# Patient Record
Sex: MALE | Race: WHITE | NOT HISPANIC OR LATINO | Employment: FULL TIME | URBAN - METROPOLITAN AREA
[De-identification: names, ages, dates, MRNs, and addresses within clinical notes are randomized per-mention and may not be internally consistent; named-entity substitution may affect disease eponyms.]

---

## 2018-06-19 ENCOUNTER — APPOINTMENT (OUTPATIENT)
Dept: RADIOLOGY | Facility: MEDICAL CENTER | Age: 43
End: 2018-06-19
Attending: EMERGENCY MEDICINE

## 2018-06-19 ENCOUNTER — HOSPITAL ENCOUNTER (EMERGENCY)
Facility: MEDICAL CENTER | Age: 43
End: 2018-06-19
Attending: EMERGENCY MEDICINE

## 2018-06-19 VITALS
HEIGHT: 67 IN | SYSTOLIC BLOOD PRESSURE: 230 MMHG | WEIGHT: 261.69 LBS | TEMPERATURE: 98.2 F | BODY MASS INDEX: 41.07 KG/M2 | DIASTOLIC BLOOD PRESSURE: 122 MMHG | HEART RATE: 89 BPM | RESPIRATION RATE: 17 BRPM | OXYGEN SATURATION: 95 %

## 2018-06-19 DIAGNOSIS — I10 ESSENTIAL HYPERTENSION: ICD-10-CM

## 2018-06-19 DIAGNOSIS — E11.8 TYPE 2 DIABETES MELLITUS WITH COMPLICATION, WITHOUT LONG-TERM CURRENT USE OF INSULIN (HCC): ICD-10-CM

## 2018-06-19 LAB
ALBUMIN SERPL BCP-MCNC: 3.7 G/DL (ref 3.2–4.9)
ALBUMIN/GLOB SERPL: 1.3 G/DL
ALP SERPL-CCNC: 58 U/L (ref 30–99)
ALT SERPL-CCNC: 12 U/L (ref 2–50)
ANION GAP SERPL CALC-SCNC: 8 MMOL/L (ref 0–11.9)
APTT PPP: 32 SEC (ref 24.7–36)
AST SERPL-CCNC: 12 U/L (ref 12–45)
BASOPHILS # BLD AUTO: 0.8 % (ref 0–1.8)
BASOPHILS # BLD: 0.11 K/UL (ref 0–0.12)
BILIRUB SERPL-MCNC: 0.4 MG/DL (ref 0.1–1.5)
BNP SERPL-MCNC: 58 PG/ML (ref 0–100)
BUN SERPL-MCNC: 14 MG/DL (ref 8–22)
CALCIUM SERPL-MCNC: 8.6 MG/DL (ref 8.5–10.5)
CHLORIDE SERPL-SCNC: 105 MMOL/L (ref 96–112)
CO2 SERPL-SCNC: 24 MMOL/L (ref 20–33)
CREAT SERPL-MCNC: 1.1 MG/DL (ref 0.5–1.4)
EOSINOPHIL # BLD AUTO: 0.4 K/UL (ref 0–0.51)
EOSINOPHIL NFR BLD: 3 % (ref 0–6.9)
ERYTHROCYTE [DISTWIDTH] IN BLOOD BY AUTOMATED COUNT: 43.4 FL (ref 35.9–50)
GLOBULIN SER CALC-MCNC: 2.9 G/DL (ref 1.9–3.5)
GLUCOSE SERPL-MCNC: 207 MG/DL (ref 65–99)
HCT VFR BLD AUTO: 45.6 % (ref 42–52)
HGB BLD-MCNC: 15.3 G/DL (ref 14–18)
IMM GRANULOCYTES # BLD AUTO: 0.06 K/UL (ref 0–0.11)
IMM GRANULOCYTES NFR BLD AUTO: 0.5 % (ref 0–0.9)
INR PPP: 0.94 (ref 0.87–1.13)
LIPASE SERPL-CCNC: 215 U/L (ref 11–82)
LYMPHOCYTES # BLD AUTO: 3.48 K/UL (ref 1–4.8)
LYMPHOCYTES NFR BLD: 26.1 % (ref 22–41)
MCH RBC QN AUTO: 29.1 PG (ref 27–33)
MCHC RBC AUTO-ENTMCNC: 33.6 G/DL (ref 33.7–35.3)
MCV RBC AUTO: 86.9 FL (ref 81.4–97.8)
MONOCYTES # BLD AUTO: 0.8 K/UL (ref 0–0.85)
MONOCYTES NFR BLD AUTO: 6 % (ref 0–13.4)
NEUTROPHILS # BLD AUTO: 8.48 K/UL (ref 1.82–7.42)
NEUTROPHILS NFR BLD: 63.6 % (ref 44–72)
NRBC # BLD AUTO: 0 K/UL
NRBC BLD-RTO: 0 /100 WBC
PLATELET # BLD AUTO: 284 K/UL (ref 164–446)
PMV BLD AUTO: 10.6 FL (ref 9–12.9)
POTASSIUM SERPL-SCNC: 3.4 MMOL/L (ref 3.6–5.5)
PROT SERPL-MCNC: 6.6 G/DL (ref 6–8.2)
PROTHROMBIN TIME: 12.3 SEC (ref 12–14.6)
RBC # BLD AUTO: 5.25 M/UL (ref 4.7–6.1)
SODIUM SERPL-SCNC: 137 MMOL/L (ref 135–145)
TROPONIN I SERPL-MCNC: 0.02 NG/ML (ref 0–0.04)
WBC # BLD AUTO: 13.3 K/UL (ref 4.8–10.8)

## 2018-06-19 PROCEDURE — 71045 X-RAY EXAM CHEST 1 VIEW: CPT

## 2018-06-19 PROCEDURE — 83690 ASSAY OF LIPASE: CPT

## 2018-06-19 PROCEDURE — 96374 THER/PROPH/DIAG INJ IV PUSH: CPT

## 2018-06-19 PROCEDURE — 85025 COMPLETE CBC W/AUTO DIFF WBC: CPT

## 2018-06-19 PROCEDURE — 700101 HCHG RX REV CODE 250: Performed by: EMERGENCY MEDICINE

## 2018-06-19 PROCEDURE — 83880 ASSAY OF NATRIURETIC PEPTIDE: CPT

## 2018-06-19 PROCEDURE — 93005 ELECTROCARDIOGRAM TRACING: CPT | Performed by: EMERGENCY MEDICINE

## 2018-06-19 PROCEDURE — 99285 EMERGENCY DEPT VISIT HI MDM: CPT

## 2018-06-19 PROCEDURE — 84484 ASSAY OF TROPONIN QUANT: CPT

## 2018-06-19 PROCEDURE — 80053 COMPREHEN METABOLIC PANEL: CPT

## 2018-06-19 PROCEDURE — 70450 CT HEAD/BRAIN W/O DYE: CPT

## 2018-06-19 PROCEDURE — 85610 PROTHROMBIN TIME: CPT

## 2018-06-19 PROCEDURE — 85730 THROMBOPLASTIN TIME PARTIAL: CPT

## 2018-06-19 RX ORDER — ACETAMINOPHEN 325 MG/1
TABLET ORAL
Status: COMPLETED
Start: 2018-06-19 | End: 2018-06-19

## 2018-06-19 RX ORDER — LABETALOL HYDROCHLORIDE 5 MG/ML
20 INJECTION, SOLUTION INTRAVENOUS ONCE
Status: COMPLETED | OUTPATIENT
Start: 2018-06-19 | End: 2018-06-19

## 2018-06-19 RX ADMIN — LABETALOL HYDROCHLORIDE 20 MG: 5 INJECTION, SOLUTION INTRAVENOUS at 16:04

## 2018-06-19 ASSESSMENT — PAIN SCALES - GENERAL: PAINLEVEL_OUTOF10: ASSUMED PAIN PRESENT

## 2018-06-19 NOTE — ED TRIAGE NOTES
Pt comes in complaining of left eye floaters. Pt was seen by his eye MD and sent here due to his blood pressure. Pt did have his eye dilated and pupil is larger on left then right. Pt was at one time placed on lisinopril and metformin however has been out of it for years due to financial reasons.

## 2018-06-19 NOTE — ED PROVIDER NOTES
"ED Provider Note  CHIEF COMPLAINT  Chief Complaint   Patient presents with   • Hypertension       HPI  Real Perry is a 43 y.o. male who presents from the optometrist office for evaluation of hypertension. The patient had some type of a retinal problem which will be dealt with later time by a retinal specialist. The optometrist sent the patient over here because the patient's blood pressure was over 200 systolic and over 120 diastolic. Patient is asymptomatic at this time. No headache, no chest pain, no difficulty breathing. No abdominal pain. No visual disturbances other than this retinal issue gets been evaluated by the optometrist and will be evaluated by the retinal specialist. The patient has a history of hypertension and diabetes because he does not have any insurance he has failed to take any medication in the past several months. He was previously on lisinopril. Also on metformin. No polyuria and no polydipsia. No chest pain or difficulty breathing or headache.    REVIEW OF SYSTEMS  No headache, no jaw pain, no chest pain, no difficulty breathing. No abdominal pain. No weakness.  ALL OTHER SYSTEMS NEGATIVE    ALLERGIES  No Known Allergies    CURRENT MEDICATIONS  None    PAST MEDICAL HISTORY  Past Medical History:   Diagnosis Date   • Diabetes    • Hypertension        SURGICAL HISTORY  Past Surgical History:   Procedure Laterality Date   • OPEN REDUCTION         FAMILY HISTORY  Family History   Problem Relation Age of Onset   • Stroke Father    • Cancer Father      prostate   • Diabetes Mother    • Diabetes Maternal Grandmother    • Cancer Paternal Grandfather      lung and prostate       SOCIAL HISTORY  Daily cigarette smoker. And drinks on occasion. She works but does not have health insurance which she states is the reason he is not on his medication.    PHYSICAL EXAM  GENERAL: Alert male adult  VITAL SIGNS: BP (!) 230/122   Pulse 86   Temp 36.8 °C (98.2 °F)   Resp (!) 23   Ht 1.702 m (5' 7\") "   Wt 118.7 kg (261 lb 11 oz)   SpO2 96%   BMI 40.99 kg/m²    Constitutional: Alertadult HENT: Scalp is normal size and nontender. Ears are clear. Nose is clear. Throat is clear with no stridor no drooling no trismus. Teeth are all intact.  Eyes: Pupils equal round and reactive to light, extraocular motor fall. There is no scleral icterus.  Neck: Neck is supple and nontender. There is no meningismus. No adenitis. No thyromegaly.  Lymphatic: No adenopathy.   Cardiovascular: Heart regular rhythm without murmurs or gallops   Thorax & Lungs: No chest wall tenderness. Lungs are clear. Patient has good breath sounds bilateral. No rales, no rhonchi, no wheezes.  Abdomen: Abdomen is soft, nontender, not rigid, no guarding, and no organomegaly. There is no palpable hernia   Skin: Warm, pink, and dry with no erythema and no rash.   Back: Nontender, no midline bony tenderness, no flank tenderness.  Extremities: Full range of motion  No tenderness to palpation and no deformities noted. No calf or thigh swelling. No calf or thigh tenderness. No clinical DVT.  Neurologic: Alert & oriented . Cranial nerves are grossly intact as tested. Patient moves all 4 extremities well. Patient has good strong flexion and extension of the ankle joints knee joints hip joints and elbow joints. Sensation is normal and symmetrical in the upper and lower extremities.   Psychiatric: Patient is alert oriented coherent and rational.     RADIOLOGY/PROCEDURES  CT-HEAD W/O   Final Result         No acute intracranial abnormality is seen.      Focal hypodensity in the right basal ganglia may represent a prominent Virchow-Sean space or an old lacunar infarct.      Mild paranasal sinus disease.      Fluid in the mastoid air cells on the left.      DX-CHEST-PORTABLE (1 VIEW)   Final Result      No radiographic evidence of acute cardiopulmonary process.            COURSE & MEDICAL DECISION MAKING  Pressurized from a optometrist office for evaluation of  asymptomatic hypertension. Blood pressure is 6E high with a systolic over 220 diastolic over 130.    Plan: #1 IV #2 cardiac monitor, pulse ox monitor, blood pressure monitor. #3. Labetalol 20 mg IV for the hypertension #4. Aboratory evaluation including CT of the head, CBC, CMP, troponin, BNP, etc. This is to rule out metabolic prompt, anemia, cardiac issues, etc. #5. Observation in the ED    Laboratory and reexamination: Chest x-ray is normal. CT of the head shows a focal hypodensity in the right basal ganglia which could represent an old lacunar infarct. No acute hemorrhage or stroke. He paces 215. Chemistry panel is otherwise unremarkable. No anemia and no bandemia. Blood pressure at 5:20 PM is now down to 160/98.    Results for orders placed or performed during the hospital encounter of 06/19/18   CBC WITH DIFFERENTIAL   Result Value Ref Range    WBC 13.3 (H) 4.8 - 10.8 K/uL    RBC 5.25 4.70 - 6.10 M/uL    Hemoglobin 15.3 14.0 - 18.0 g/dL    Hematocrit 45.6 42.0 - 52.0 %    MCV 86.9 81.4 - 97.8 fL    MCH 29.1 27.0 - 33.0 pg    MCHC 33.6 (L) 33.7 - 35.3 g/dL    RDW 43.4 35.9 - 50.0 fL    Platelet Count 284 164 - 446 K/uL    MPV 10.6 9.0 - 12.9 fL    Neutrophils-Polys 63.60 44.00 - 72.00 %    Lymphocytes 26.10 22.00 - 41.00 %    Monocytes 6.00 0.00 - 13.40 %    Eosinophils 3.00 0.00 - 6.90 %    Basophils 0.80 0.00 - 1.80 %    Immature Granulocytes 0.50 0.00 - 0.90 %    Nucleated RBC 0.00 /100 WBC    Neutrophils (Absolute) 8.48 (H) 1.82 - 7.42 K/uL    Lymphs (Absolute) 3.48 1.00 - 4.80 K/uL    Monos (Absolute) 0.80 0.00 - 0.85 K/uL    Eos (Absolute) 0.40 0.00 - 0.51 K/uL    Baso (Absolute) 0.11 0.00 - 0.12 K/uL    Immature Granulocytes (abs) 0.06 0.00 - 0.11 K/uL    NRBC (Absolute) 0.00 K/uL   COMP METABOLIC PANEL   Result Value Ref Range    Sodium 137 135 - 145 mmol/L    Potassium 3.4 (L) 3.6 - 5.5 mmol/L    Chloride 105 96 - 112 mmol/L    Co2 24 20 - 33 mmol/L    Anion Gap 8.0 0.0 - 11.9    Glucose 207 (H) 65 -  99 mg/dL    Bun 14 8 - 22 mg/dL    Creatinine 1.10 0.50 - 1.40 mg/dL    Calcium 8.6 8.5 - 10.5 mg/dL    AST(SGOT) 12 12 - 45 U/L    ALT(SGPT) 12 2 - 50 U/L    Alkaline Phosphatase 58 30 - 99 U/L    Total Bilirubin 0.4 0.1 - 1.5 mg/dL    Albumin 3.7 3.2 - 4.9 g/dL    Total Protein 6.6 6.0 - 8.2 g/dL    Globulin 2.9 1.9 - 3.5 g/dL    A-G Ratio 1.3 g/dL   LIPASE   Result Value Ref Range    Lipase 215 (H) 11 - 82 U/L   PROTHROMBIN TIME   Result Value Ref Range    PT 12.3 12.0 - 14.6 sec    INR 0.94 0.87 - 1.13   APTT   Result Value Ref Range    APTT 32.0 24.7 - 36.0 sec   TROPONIN   Result Value Ref Range    Troponin I 0.02 0.00 - 0.04 ng/mL   BTYPE NATRIURETIC PEPTIDE   Result Value Ref Range    B Natriuretic Peptide 58 0 - 100 pg/mL   ESTIMATED GFR   Result Value Ref Range    GFR If African American >60 >60 mL/min/1.73 m 2    GFR If Non African American >60 >60 mL/min/1.73 m 2      At 5:40 PM the patient is asymptomatic. With no headache and no chest pain and no difficulty breathing. He is up and ambulatory. He declines admission to the hospital. He has no abdominal pain. He says he doesn't drink. He'll be given a prescription for some Lopressor and I told him he needs to get his blood pressure checked tomorrow either here or at the [facility] were by a family physician.    Home treatment: #1 the patient been given a copy of all of his reports #2 prescription for Lopressor No. 3 patient will get his blood pressure checked here in the ED tomorrow or at the [facility] were by his family physician. #4 is being given instructions on hypertension.  FINAL IMPRESSION  1. Hypertensive urgency  2. Hypertension/diabetes mellitus2  3. Elevated lipase.       Electronically signed by: Gary Gansert, 6/19/2018 4:21 PM

## 2018-06-20 NOTE — DISCHARGE INSTRUCTIONS
Diabetes, Frequently Asked Questions  WHAT IS DIABETES?  Most of the food we eat is turned into glucose (sugar). Our bodies use it for energy. The pancreas makes a hormone called insulin. It helps glucose get into the cells of our bodies. When you have diabetes, your body either does not make enough insulin or cannot use its own insulin as well as it should. This causes sugars to build up in your blood.  WHAT ARE THE SYMPTOMS OF DIABETES?  · Frequent urination.   · Excessive thirst.   · Unexplained weight loss.   · Extreme hunger.   · Blurred vision.   · Tingling or numbness in hands or feet.   · Feeling very tired much of the time.   · Dry, itchy skin.   · Sores that are slow to heal.   · Yeast infections.   WHAT ARE THE TYPES OF DIABETES?  Type 1 Diabetes   · About 10% of affected people have this type.   · Usually occurs before the age of 30.   · Usually occurs in thin to normal weight people.   Type 2 Diabetes  · About 90% of affected people have this type.   · Usually occurs after the age of 40.   · Usually occurs in overweight people.   · More likely to have:   · A family history of diabetes.   · A history of diabetes during pregnancy (gestational diabetes).   · High blood pressure.   · High cholesterol and triglycerides.   Gestational Diabetes  · Occurs in about 4% of pregnancies.   · Usually goes away after the baby is born.   · More likely to occur in women with:   · Family history of diabetes.   · Previous gestational diabetes.   · Obese.   · Over 25 years old.   WHAT IS PRE-DIABETES?  Pre-diabetes means your blood glucose is higher than normal, but lower than the diabetes range. It also means you are at risk of getting type 2 diabetes and heart disease. If you are told you have pre-diabetes, have your blood glucose checked again in 1 to 2 years.  WHAT IS THE TREATMENT FOR DIABETES?  Treatment is aimed at keeping blood glucose near normal levels at all times. Learning how to manage this yourself is  important in treating diabetes. Depending on the type of diabetes you have, your treatment will include one or more of the following:  · Monitoring your blood glucose.   · Meal planning.   · Exercise.   · Oral medicine (pills) or insulin.   CAN DIABETES BE PREVENTED?  With type 1 diabetes, prevention is more difficult, because the triggers that cause it are not yet known.  With type 2 diabetes, prevention is more likely, with lifestyle changes:  · Maintain a healthy weight.   · Eat healthy.   · Exercise.   IS THERE A CURE FOR DIABETES?  No, there is no cure for diabetes. There is a lot of research going on that is looking for a cure, and progress is being made. Diabetes can be treated and controlled. People with diabetes can manage their diabetes and lead normal, active lives.  SHOULD I BE TESTED FOR DIABETES?  If you are at least 45 years old, you should be tested for diabetes. You should be tested again every 3 years. If you are 45 or older and overweight, you may want to get tested more often. If you are younger than 45, overweight, and have one or more of the following risk factors, you should be tested:  · Family history of diabetes.   · Inactive lifestyle.   · High blood pressure.   WHAT ARE SOME OTHER SOURCES FOR INFORMATION ON DIABETES?  The following organizations may help in your search for more information on diabetes:  National Diabetes Education Program (NDEP)  Internet: http://www.ndep.nih.gov/resources  American Diabetes Association  Internet: http://www.diabetes.org   Juvenile Diabetes Foundation International  Internet: http://www.jdf.org  Document Released: 12/20/2004 Document Revised: 03/11/2013 Document Reviewed: 10/15/2010  [employer]Care® Patient Information ©2013 [employer].  Arterial Hypertension  Arterial hypertension (high blood pressure) is a condition of elevated pressure in your blood vessels. Hypertension over a long period of time is a risk factor for strokes, heart attacks, and heart  "failure. It is also the leading cause of kidney (renal) failure.   CAUSES   · In Adults -- Over 90% of all hypertension has no known cause. This is called essential or primary hypertension. In the other 10% of people with hypertension, the increase in blood pressure is caused by another disorder. This is called secondary hypertension. Important causes of secondary hypertension are:  · Heavy alcohol use.  · Obstructive sleep apnea.  · Hyperaldosterosim (Conn's syndrome).  · Steroid use.  · Chronic kidney failure.  · Hyperparathyroidism.  · Medications.  · Renal artery stenosis.  · Pheochromocytoma.  · Cushing's disease.  · Coarctation of the aorta.  · Scleroderma renal crisis.  · Licorice (in excessive amounts).  · Drugs (cocaine, methamphetamine).  Your caregiver can explain any items above that apply to you.  · In Children -- Secondary hypertension is more common and should always be considered.  · Pregnancy -- Few women of childbearing age have high blood pressure. However, up to 10% of them develop hypertension of pregnancy. Generally, this will not harm the woman. It may be a sign of 3 complications of pregnancy: preeclampsia, HELLP syndrome, and eclampsia. Follow up and control with medication is necessary.  SYMPTOMS   · This condition normally does not produce any noticeable symptoms. It is usually found during a routine exam.  · Malignant hypertension is a late problem of high blood pressure. It may have the following symptoms:  · Headaches.  · Blurred vision.  · End-organ damage (this means your kidneys, heart, lungs, and other organs are being damaged).  · Stressful situations can increase the blood pressure. If a person with normal blood pressure has their blood pressure go up while being seen by their caregiver, this is often termed \"white coat hypertension.\" Its importance is not known. It may be related with eventually developing hypertension or complications of hypertension.  · Hypertension is often " "confused with mental tension, stress, and anxiety.  DIAGNOSIS   The diagnosis is made by 3 separate blood pressure measurements. They are taken at least 1 week apart from each other. If there is organ damage from hypertension, the diagnosis may be made without repeat measurements.  Hypertension is usually identified by having blood pressure readings:  · Above 140/90 mmHg measured in both arms, at 3 separate times, over a couple weeks.  · Over 130/80 mmHg should be considered a risk factor and may require treatment in patients with diabetes.  Blood pressure readings over 120/80 mmHg are called \"pre-hypertension\" even in non-diabetic patients.  To get a true blood pressure measurement, use the following guidelines. Be aware of the factors that can alter blood pressure readings.  · Take measurements at least 1 hour after caffeine.  · Take measurements 30 minutes after smoking and without any stress. This is another reason to quit smoking  it raises your blood pressure.  · Use a proper cuff size. Ask your caregiver if you are not sure about your cuff size.  · Most home blood pressure cuffs are automatic. They will measure systolic and diastolic pressures. The systolic pressure is the pressure reading at the start of sounds. Diastolic pressure is the pressure at which the sounds disappear. If you are elderly, measure pressures in multiple postures. Try sitting, lying or standing.  · Sit at rest for a minimum of 5 minutes before taking measurements.  · You should not be on any medications like decongestants. These are found in many cold medications.  · Record your blood pressure readings and review them with your caregiver.  If you have hypertension:  · Your caregiver may do tests to be sure you do not have secondary hypertension (see \"causes\" above).  · Your caregiver may also look for signs of metabolic syndrome. This is also called Syndrome X or Insulin Resistance Syndrome. You may have this syndrome if you have type 2 " diabetes, abdominal obesity, and abnormal blood lipids in addition to hypertension.  · Your caregiver will take your medical and family history and perform a physical exam.  · Diagnostic tests may include blood tests (for glucose, cholesterol, potassium, and kidney function), a urinalysis, or an EKG. Other tests may also be necessary depending on your condition.  PREVENTION   There are important lifestyle issues that you can adopt to reduce your chance of developing hypertension:  · Maintain a normal weight.  · Limit the amount of salt (sodium) in your diet.  · Exercise often.  · Limit alcohol intake.  · Get enough potassium in your diet. Discuss specific advice with your caregiver.  · Follow a DASH diet (dietary approaches to stop hypertension). This diet is rich in fruits, vegetables, and low-fat dairy products, and avoids certain fats.  PROGNOSIS   Essential hypertension cannot be cured. Lifestyle changes and medical treatment can lower blood pressure and reduce complications. The prognosis of secondary hypertension depends on the underlying cause. Many people whose hypertension is controlled with medicine or lifestyle changes can live a normal, healthy life.   RISKS AND COMPLICATIONS   While high blood pressure alone is not an illness, it often requires treatment due to its short- and long-term effects on many organs. Hypertension increases your risk for:  · CVAs or strokes (cerebrovascular accident).  · Heart failure due to chronically high blood pressure (hypertensive cardiomyopathy).  · Heart attack (myocardial infarction).  · Damage to the retina (hypertensive retinopathy).  · Kidney failure (hypertensive nephropathy).  Your caregiver can explain list items above that apply to you. Treatment of hypertension can significantly reduce the risk of complications.  TREATMENT   · For overweight patients, weight loss and regular exercise are recommended. Physical fitness lowers blood pressure.  · Mild hypertension  "is usually treated with diet and exercise. A diet rich in fruits and vegetables, fat-free dairy products, and foods low in fat and salt (sodium) can help lower blood pressure. Decreasing salt intake decreases blood pressure in a 1/3 of people.  · Stop smoking if you are a smoker.  The steps above are highly effective in reducing blood pressure. While these actions are easy to suggest, they are difficult to achieve. Most patients with moderate or severe hypertension end up requiring medications to bring their blood pressure down to a normal level. There are several classes of medications for treatment. Blood pressure pills (antihypertensives) will lower blood pressure by their different actions. Lowering the blood pressure by 10 mmHg may decrease the risk of complications by as much as 25%.  The goal of treatment is effective blood pressure control. This will reduce your risk for complications. Your caregiver will help you determine the best treatment for you according to your lifestyle. What is excellent treatment for one person, may not be for you.  HOME CARE INSTRUCTIONS   · Do not smoke.  · Follow the lifestyle changes outlined in the \"Prevention\" section.  · If you are on medications, follow the directions carefully. Blood pressure medications must be taken as prescribed. Skipping doses reduces their benefit. It also puts you at risk for problems.  · Follow up with your caregiver, as directed.  · If you are asked to monitor your blood pressure at home, follow the guidelines in the \"Diagnosis\" section above.  SEEK MEDICAL CARE IF:   · You think you are having medication side effects.  · You have recurrent headaches or lightheadedness.  · You have swelling in your ankles.  · You have trouble with your vision.  SEEK IMMEDIATE MEDICAL CARE IF:   · You have sudden onset of chest pain or pressure, difficulty breathing, or other symptoms of a heart attack.  · You have a severe headache.  · You have symptoms of a stroke " (such as sudden weakness, difficulty speaking, difficulty walking).  MAKE SURE YOU:   · Understand these instructions.  · Will watch your condition.  · Will get help right away if you are not doing well or get worse.  Document Released: 12/18/2006 Document Revised: 03/11/2013 Document Reviewed: 07/17/2008  [employer]® Patient Information ©2014 [employer].

## 2018-06-21 LAB — EKG IMPRESSION: NORMAL

## 2019-06-24 ENCOUNTER — HOSPITAL ENCOUNTER (INPATIENT)
Facility: MEDICAL CENTER | Age: 44
LOS: 4 days | DRG: 305 | End: 2019-06-28
Attending: EMERGENCY MEDICINE | Admitting: HOSPITALIST

## 2019-06-24 DIAGNOSIS — R73.9 HYPERGLYCEMIA: ICD-10-CM

## 2019-06-24 DIAGNOSIS — N28.9 RENAL INSUFFICIENCY: ICD-10-CM

## 2019-06-24 DIAGNOSIS — H54.62 VISION LOSS OF LEFT EYE: ICD-10-CM

## 2019-06-24 DIAGNOSIS — I16.0 HYPERTENSIVE URGENCY: ICD-10-CM

## 2019-06-24 PROCEDURE — 99358 PROLONG SERVICE W/O CONTACT: CPT | Performed by: HOSPITALIST

## 2019-06-24 PROCEDURE — 99285 EMERGENCY DEPT VISIT HI MDM: CPT

## 2019-06-24 PROCEDURE — 770006 HCHG ROOM/CARE - MED/SURG/GYN SEMI*

## 2019-06-24 PROCEDURE — 83036 HEMOGLOBIN GLYCOSYLATED A1C: CPT

## 2019-06-24 PROCEDURE — 99406 BEHAV CHNG SMOKING 3-10 MIN: CPT | Performed by: HOSPITALIST

## 2019-06-24 PROCEDURE — 36415 COLL VENOUS BLD VENIPUNCTURE: CPT

## 2019-06-24 PROCEDURE — 99223 1ST HOSP IP/OBS HIGH 75: CPT | Mod: 25 | Performed by: HOSPITALIST

## 2019-06-24 RX ORDER — M-VIT,TX,IRON,MINS/CALC/FOLIC 27MG-0.4MG
1 TABLET ORAL DAILY
COMMUNITY
End: 2022-07-14

## 2019-06-24 ASSESSMENT — LIFESTYLE VARIABLES: DO YOU DRINK ALCOHOL: NO

## 2019-06-25 ENCOUNTER — HOSPITAL ENCOUNTER (OUTPATIENT)
Dept: RADIOLOGY | Facility: MEDICAL CENTER | Age: 44
End: 2019-06-25

## 2019-06-25 PROBLEM — H53.432: Status: ACTIVE | Noted: 2019-06-25

## 2019-06-25 PROBLEM — H53.132 ACUTE LOSS OF VISION, LEFT: Status: ACTIVE | Noted: 2019-06-25

## 2019-06-25 PROBLEM — I10 ACCELERATED HYPERTENSION: Status: ACTIVE | Noted: 2019-06-25

## 2019-06-25 PROBLEM — N17.9 AKI (ACUTE KIDNEY INJURY) (HCC): Status: ACTIVE | Noted: 2019-06-25

## 2019-06-25 LAB
ALBUMIN SERPL BCP-MCNC: 3.3 G/DL (ref 3.2–4.9)
ALBUMIN/GLOB SERPL: 1.1 G/DL
ALP SERPL-CCNC: 59 U/L (ref 30–99)
ALT SERPL-CCNC: 12 U/L (ref 2–50)
ANION GAP SERPL CALC-SCNC: 11 MMOL/L (ref 0–11.9)
AST SERPL-CCNC: 13 U/L (ref 12–45)
BASOPHILS # BLD AUTO: 1 % (ref 0–1.8)
BASOPHILS # BLD: 0.1 K/UL (ref 0–0.12)
BILIRUB SERPL-MCNC: 0.5 MG/DL (ref 0.1–1.5)
BUN SERPL-MCNC: 20 MG/DL (ref 8–22)
CALCIUM SERPL-MCNC: 8.5 MG/DL (ref 8.5–10.5)
CHLORIDE SERPL-SCNC: 104 MMOL/L (ref 96–112)
CHLORIDE UR-SCNC: 62 MMOL/L
CO2 SERPL-SCNC: 23 MMOL/L (ref 20–33)
CREAT SERPL-MCNC: 1.8 MG/DL (ref 0.5–1.4)
CREAT UR-MCNC: 188.7 MG/DL
EOSINOPHIL # BLD AUTO: 0.31 K/UL (ref 0–0.51)
EOSINOPHIL NFR BLD: 3 % (ref 0–6.9)
ERYTHROCYTE [DISTWIDTH] IN BLOOD BY AUTOMATED COUNT: 43.8 FL (ref 35.9–50)
EST. AVERAGE GLUCOSE BLD GHB EST-MCNC: 240 MG/DL
GLOBULIN SER CALC-MCNC: 2.9 G/DL (ref 1.9–3.5)
GLUCOSE BLD-MCNC: 102 MG/DL (ref 65–99)
GLUCOSE BLD-MCNC: 171 MG/DL (ref 65–99)
GLUCOSE BLD-MCNC: 240 MG/DL (ref 65–99)
GLUCOSE BLD-MCNC: 249 MG/DL (ref 65–99)
GLUCOSE SERPL-MCNC: 253 MG/DL (ref 65–99)
HBA1C MFR BLD: 10 % (ref 0–5.6)
HCT VFR BLD AUTO: 42.3 % (ref 42–52)
HGB BLD-MCNC: 14.5 G/DL (ref 14–18)
IMM GRANULOCYTES # BLD AUTO: 0.03 K/UL (ref 0–0.11)
IMM GRANULOCYTES NFR BLD AUTO: 0.3 % (ref 0–0.9)
LYMPHOCYTES # BLD AUTO: 2.14 K/UL (ref 1–4.8)
LYMPHOCYTES NFR BLD: 20.5 % (ref 22–41)
MCH RBC QN AUTO: 29.8 PG (ref 27–33)
MCHC RBC AUTO-ENTMCNC: 34.3 G/DL (ref 33.7–35.3)
MCV RBC AUTO: 86.9 FL (ref 81.4–97.8)
MONOCYTES # BLD AUTO: 0.73 K/UL (ref 0–0.85)
MONOCYTES NFR BLD AUTO: 7 % (ref 0–13.4)
NEUTROPHILS # BLD AUTO: 7.13 K/UL (ref 1.82–7.42)
NEUTROPHILS NFR BLD: 68.2 % (ref 44–72)
NRBC # BLD AUTO: 0 K/UL
NRBC BLD-RTO: 0 /100 WBC
PLATELET # BLD AUTO: 255 K/UL (ref 164–446)
PMV BLD AUTO: 10.8 FL (ref 9–12.9)
POTASSIUM SERPL-SCNC: 3.8 MMOL/L (ref 3.6–5.5)
POTASSIUM UR-SCNC: 33.9 MMOL/L
PROT SERPL-MCNC: 6.2 G/DL (ref 6–8.2)
PROT UR-MCNC: 1157.3 MG/DL (ref 0–15)
RBC # BLD AUTO: 4.87 M/UL (ref 4.7–6.1)
SODIUM SERPL-SCNC: 138 MMOL/L (ref 135–145)
SODIUM UR-SCNC: 70 MMOL/L
WBC # BLD AUTO: 10.4 K/UL (ref 4.8–10.8)

## 2019-06-25 PROCEDURE — A9270 NON-COVERED ITEM OR SERVICE: HCPCS | Performed by: FAMILY MEDICINE

## 2019-06-25 PROCEDURE — 700111 HCHG RX REV CODE 636 W/ 250 OVERRIDE (IP): Performed by: HOSPITALIST

## 2019-06-25 PROCEDURE — 82436 ASSAY OF URINE CHLORIDE: CPT

## 2019-06-25 PROCEDURE — 80053 COMPREHEN METABOLIC PANEL: CPT

## 2019-06-25 PROCEDURE — 700102 HCHG RX REV CODE 250 W/ 637 OVERRIDE(OP): Performed by: FAMILY MEDICINE

## 2019-06-25 PROCEDURE — 99233 SBSQ HOSP IP/OBS HIGH 50: CPT | Performed by: FAMILY MEDICINE

## 2019-06-25 PROCEDURE — 700102 HCHG RX REV CODE 250 W/ 637 OVERRIDE(OP): Performed by: HOSPITALIST

## 2019-06-25 PROCEDURE — 82962 GLUCOSE BLOOD TEST: CPT

## 2019-06-25 PROCEDURE — 84133 ASSAY OF URINE POTASSIUM: CPT

## 2019-06-25 PROCEDURE — 85025 COMPLETE CBC W/AUTO DIFF WBC: CPT

## 2019-06-25 PROCEDURE — 84300 ASSAY OF URINE SODIUM: CPT

## 2019-06-25 PROCEDURE — 84156 ASSAY OF PROTEIN URINE: CPT

## 2019-06-25 PROCEDURE — 770006 HCHG ROOM/CARE - MED/SURG/GYN SEMI*

## 2019-06-25 PROCEDURE — 82570 ASSAY OF URINE CREATININE: CPT

## 2019-06-25 PROCEDURE — 36415 COLL VENOUS BLD VENIPUNCTURE: CPT

## 2019-06-25 PROCEDURE — A9270 NON-COVERED ITEM OR SERVICE: HCPCS | Performed by: HOSPITALIST

## 2019-06-25 RX ORDER — HYDROCHLOROTHIAZIDE 25 MG/1
25 TABLET ORAL
Status: DISCONTINUED | OUTPATIENT
Start: 2019-06-25 | End: 2019-06-27

## 2019-06-25 RX ORDER — LISINOPRIL 20 MG/1
40 TABLET ORAL
Status: DISCONTINUED | OUTPATIENT
Start: 2019-06-25 | End: 2019-06-27

## 2019-06-25 RX ORDER — BISACODYL 10 MG
10 SUPPOSITORY, RECTAL RECTAL
Status: DISCONTINUED | OUTPATIENT
Start: 2019-06-25 | End: 2019-06-28 | Stop reason: HOSPADM

## 2019-06-25 RX ORDER — PROMETHAZINE HYDROCHLORIDE 25 MG/1
12.5-25 TABLET ORAL EVERY 4 HOURS PRN
Status: DISCONTINUED | OUTPATIENT
Start: 2019-06-25 | End: 2019-06-28 | Stop reason: HOSPADM

## 2019-06-25 RX ORDER — PROMETHAZINE HYDROCHLORIDE 25 MG/1
12.5-25 SUPPOSITORY RECTAL EVERY 4 HOURS PRN
Status: DISCONTINUED | OUTPATIENT
Start: 2019-06-25 | End: 2019-06-28 | Stop reason: HOSPADM

## 2019-06-25 RX ORDER — ONDANSETRON 4 MG/1
4 TABLET, ORALLY DISINTEGRATING ORAL EVERY 4 HOURS PRN
Status: DISCONTINUED | OUTPATIENT
Start: 2019-06-25 | End: 2019-06-28 | Stop reason: HOSPADM

## 2019-06-25 RX ORDER — POLYETHYLENE GLYCOL 3350 17 G/17G
1 POWDER, FOR SOLUTION ORAL
Status: DISCONTINUED | OUTPATIENT
Start: 2019-06-25 | End: 2019-06-28 | Stop reason: HOSPADM

## 2019-06-25 RX ORDER — ONDANSETRON 2 MG/ML
4 INJECTION INTRAMUSCULAR; INTRAVENOUS EVERY 4 HOURS PRN
Status: DISCONTINUED | OUTPATIENT
Start: 2019-06-25 | End: 2019-06-28 | Stop reason: HOSPADM

## 2019-06-25 RX ORDER — AMOXICILLIN 250 MG
2 CAPSULE ORAL 2 TIMES DAILY
Status: DISCONTINUED | OUTPATIENT
Start: 2019-06-25 | End: 2019-06-28 | Stop reason: HOSPADM

## 2019-06-25 RX ORDER — GLIPIZIDE 5 MG/1
5 TABLET ORAL
Status: DISCONTINUED | OUTPATIENT
Start: 2019-06-25 | End: 2019-06-28 | Stop reason: HOSPADM

## 2019-06-25 RX ORDER — ATORVASTATIN CALCIUM 40 MG/1
40 TABLET, FILM COATED ORAL EVERY EVENING
Status: DISCONTINUED | OUTPATIENT
Start: 2019-06-25 | End: 2019-06-28 | Stop reason: HOSPADM

## 2019-06-25 RX ORDER — NICOTINE 21 MG/24HR
14 PATCH, TRANSDERMAL 24 HOURS TRANSDERMAL
Status: DISCONTINUED | OUTPATIENT
Start: 2019-06-25 | End: 2019-06-28 | Stop reason: HOSPADM

## 2019-06-25 RX ORDER — HYDROCHLOROTHIAZIDE 12.5 MG/1
12.5 TABLET ORAL
Status: DISCONTINUED | OUTPATIENT
Start: 2019-06-25 | End: 2019-06-25

## 2019-06-25 RX ORDER — LISINOPRIL 20 MG/1
20 TABLET ORAL
Status: DISCONTINUED | OUTPATIENT
Start: 2019-06-25 | End: 2019-06-25

## 2019-06-25 RX ORDER — ENALAPRILAT 1.25 MG/ML
1.25 INJECTION INTRAVENOUS EVERY 6 HOURS PRN
Status: DISCONTINUED | OUTPATIENT
Start: 2019-06-25 | End: 2019-06-28 | Stop reason: HOSPADM

## 2019-06-25 RX ORDER — HYDRALAZINE HYDROCHLORIDE 20 MG/ML
10 INJECTION INTRAMUSCULAR; INTRAVENOUS EVERY 6 HOURS PRN
Status: DISCONTINUED | OUTPATIENT
Start: 2019-06-25 | End: 2019-06-28 | Stop reason: HOSPADM

## 2019-06-25 RX ADMIN — HYDROCHLOROTHIAZIDE 25 MG: 25 TABLET ORAL at 15:17

## 2019-06-25 RX ADMIN — INSULIN HUMAN 2 UNITS: 100 INJECTION, SOLUTION PARENTERAL at 08:52

## 2019-06-25 RX ADMIN — GLIPIZIDE 5 MG: 10 TABLET ORAL at 16:59

## 2019-06-25 RX ADMIN — HYDRALAZINE HYDROCHLORIDE 10 MG: 20 INJECTION INTRAMUSCULAR; INTRAVENOUS at 22:37

## 2019-06-25 RX ADMIN — INSULIN HUMAN 1 UNITS: 100 INJECTION, SOLUTION PARENTERAL at 17:06

## 2019-06-25 RX ADMIN — LISINOPRIL 40 MG: 20 TABLET ORAL at 15:18

## 2019-06-25 RX ADMIN — HYDRALAZINE HYDROCHLORIDE 10 MG: 20 INJECTION INTRAMUSCULAR; INTRAVENOUS at 20:50

## 2019-06-25 RX ADMIN — ATORVASTATIN CALCIUM 40 MG: 40 TABLET, FILM COATED ORAL at 16:58

## 2019-06-25 RX ADMIN — INSULIN HUMAN 2 UNITS: 100 INJECTION, SOLUTION PARENTERAL at 11:06

## 2019-06-25 RX ADMIN — LISINOPRIL 20 MG: 20 TABLET ORAL at 00:43

## 2019-06-25 RX ADMIN — HYDRALAZINE HYDROCHLORIDE 10 MG: 20 INJECTION INTRAMUSCULAR; INTRAVENOUS at 03:59

## 2019-06-25 RX ADMIN — HYDRALAZINE HYDROCHLORIDE 10 MG: 20 INJECTION INTRAMUSCULAR; INTRAVENOUS at 02:23

## 2019-06-25 RX ADMIN — HYDROCHLOROTHIAZIDE 12.5 MG: 12.5 TABLET ORAL at 05:05

## 2019-06-25 RX ADMIN — HYDRALAZINE HYDROCHLORIDE 10 MG: 20 INJECTION INTRAMUSCULAR; INTRAVENOUS at 11:57

## 2019-06-25 RX ADMIN — SENNOSIDES, DOCUSATE SODIUM 2 TABLET: 50; 8.6 TABLET, FILM COATED ORAL at 05:05

## 2019-06-25 ASSESSMENT — COGNITIVE AND FUNCTIONAL STATUS - GENERAL
SUGGESTED CMS G CODE MODIFIER DAILY ACTIVITY: CH
SUGGESTED CMS G CODE MODIFIER MOBILITY: CH
MOBILITY SCORE: 24
DAILY ACTIVITIY SCORE: 24

## 2019-06-25 ASSESSMENT — ENCOUNTER SYMPTOMS
ABDOMINAL PAIN: 0
DIARRHEA: 0
ORTHOPNEA: 0
COUGH: 0
BRUISES/BLEEDS EASILY: 0
TINGLING: 1
FOCAL WEAKNESS: 0
WHEEZING: 0
NECK PAIN: 0
FEVER: 0
HEADACHES: 0
PHOTOPHOBIA: 0
MYALGIAS: 0
EYE PAIN: 0
PALPITATIONS: 0
NAUSEA: 0
BLURRED VISION: 1
DEPRESSION: 0
CHILLS: 0
SENSORY CHANGE: 1
HEMOPTYSIS: 0
DOUBLE VISION: 0
BACK PAIN: 0
DIZZINESS: 0
TINGLING: 0
SHORTNESS OF BREATH: 0
HEARTBURN: 0
VOMITING: 0
WEIGHT LOSS: 0
SORE THROAT: 0

## 2019-06-25 ASSESSMENT — LIFESTYLE VARIABLES
SUBSTANCE_ABUSE: 0
EVER_SMOKED: YES

## 2019-06-25 ASSESSMENT — PATIENT HEALTH QUESTIONNAIRE - PHQ9
SUM OF ALL RESPONSES TO PHQ9 QUESTIONS 1 AND 2: 0
1. LITTLE INTEREST OR PLEASURE IN DOING THINGS: NOT AT ALL
2. FEELING DOWN, DEPRESSED, IRRITABLE, OR HOPELESS: NOT AT ALL

## 2019-06-25 NOTE — PROGRESS NOTES
2 RN Skin Check:    R large inguinal hernia present    All other bony prominences intact, no evidence of skin breakdown

## 2019-06-25 NOTE — ASSESSMENT & PLAN NOTE
The patient's blood pressure is elevated in the 300 range, suspect he has been living at this level for quite some time.  He has no evidence of DKA.    6/25: Hemoglobin A1c 10.0 %.  Patient has been off his diabetes medications for 5 years due to financial difficulties.  He does not have insurance.  Will be poor candidate for insulin at this point.  I started glipizide 5 mg p.o. twice daily.  Monitor blood glucoses for now.  Continue with sliding scale insulin.  6/26: Blood glucoses much improved with glipizide.  Continue to monitor for now.  6/27: Blood glucoses well controlled for now.  Continue current regimen.

## 2019-06-25 NOTE — H&P
Hospital Medicine History & Physical Note    Date of Service  6/24/2019    Primary Care Physician  Pcp Pt States None    Consultants  Dr. Garza, ophthalmology    Code Status  Full    Chief Complaint  Chief Complaint   Patient presents with   • Hypertension       History of Presenting Illness  44 y.o. male who presented on 6/24/2019 in transfer from outside facility for hypertensive urgency/emergency and visual field changes in the left eye.  This is a 44-year-old gentleman with a known history of hypertension, diabetes mellitus, and hyperlipidemia who has been untreated for the last 5 years.  The patient reported the outside facility with complaints of elevated blood pressures and visual changes in the left eye.  He states that he was diagnosed with hypertension approximately 10 years ago but that he discontinued his medications 5 years ago secondary to financial difficulties.  He was previously on an ACE inhibitor.  In the past year, he has had what he describes as a ruptured vessel in the back of his eyes and has required some type of injection with ophthalmology.  He decided to bring himself to the hospital today to request assistance with managing his medical issues.    At the outside facility, chest x-ray was obtained which showed no acute pulmonary or cardiac abnormalities.  WBC 10.9 hemoglobin 14.3 hematocrit 42.5 platelet 273 sodium 140 potassium 3.9 chloride 107 CO2 26 BUN 23 creatinine 2.12 and glucose 318.  The patient was transferred to our facility for higher level of care and specialist consultation.    Review of Systems  Review of Systems   Constitutional: Negative for chills and fever.   HENT: Negative for congestion and sore throat.    Eyes: Negative for photophobia.   Respiratory: Negative for cough, shortness of breath and wheezing.    Cardiovascular: Negative for chest pain and palpitations.   Gastrointestinal: Negative for abdominal pain, diarrhea, nausea and vomiting.   Genitourinary: Negative  for dysuria.   Musculoskeletal: Negative for myalgias.   Skin: Negative.    Neurological: Positive for sensory change (No vision in the center of the left eye). Negative for dizziness, tingling, focal weakness and headaches.   Psychiatric/Behavioral: Negative for depression and suicidal ideas.       Past Medical History  Past Medical History:   Diagnosis Date   • Diabetes    • Hypertension        Surgical History  Past Surgical History:   Procedure Laterality Date   • OPEN REDUCTION         Family History  Family History   Problem Relation Age of Onset   • Stroke Father    • Cancer Father         prostate   • Diabetes Mother    • Diabetes Maternal Grandmother    • Cancer Paternal Grandfather         lung and prostate       Social History  Social History   Substance Use Topics   • Smoking status: Current Every Day Smoker     Packs/day: 1.00     Years: 22.00     Types: Cigarettes   • Smokeless tobacco: Never Used   • Alcohol use Yes      Comment: 1-2 year       Allergies  No Known Allergies    Medications  No current facility-administered medications on file prior to encounter.      Current Outpatient Prescriptions on File Prior to Encounter   Medication Sig Dispense Refill   • metoprolol (LOPRESSOR) 25 MG Tab Take 1 Tab by mouth 2 times a day. 60 Tab 0   • metformin (GLUCOPHAGE) 500 MG Tab Take 1 Tab by mouth 2 times a day, with meals. 60 Tab 0   • lisinopril-hydrochlorothiazide (PRINZIDE, ZESTORETIC) 20-12.5 MG per tablet Take 1 Tab by mouth every day. 30 Tab 0   • metformin (GLUCOPHAGE) 500 MG TABS Take 1 Tab by mouth 2 times a day, with meals. 60 Tab 3   • fenofibrate (TRICOR) 48 MG TABS Take 1 Tab by mouth every day. 30 Tab 3   • fenofibrate micronized (ANTARA) 43 MG CAPS Take 1 Cap by mouth every morning. 30 Each 3       Physical Exam  Hemodynamics  Temp (24hrs), Av.7 °C (98 °F), Min:36.7 °C (98 °F), Max:36.7 °C (98 °F)   Temperature: 36.7 °C (98 °F)  Pulse  Av  Min: 85  Max: 85    Blood Pressure: (!)  "186/113      Respiratory      Respiration: 18             Physical Exam   Constitutional: He is oriented to person, place, and time. No distress.   Obese   HENT:   Head: Normocephalic and atraumatic.   Right Ear: External ear normal.   Left Ear: External ear normal.   Eyes: EOM are normal. Right eye exhibits no discharge. Left eye exhibits no discharge.   Neck: Neck supple. No JVD present.   Cardiovascular: Normal rate, regular rhythm and normal heart sounds.    Pulmonary/Chest: Effort normal and breath sounds normal. No respiratory distress. He exhibits no tenderness.   Abdominal: Soft. Bowel sounds are normal. He exhibits no distension. There is no tenderness.   Musculoskeletal: He exhibits no edema.   Neurological: He is alert and oriented to person, place, and time. No cranial nerve deficit.   Skin: Skin is dry. He is not diaphoretic. No erythema.   Psychiatric: He has a normal mood and affect. His behavior is normal.   Nursing note and vitals reviewed.    Capillary refill less than 3 seconds, distal pulses intact    Laboratory:          No results for input(s): ALTSGPT, ASTSGOT, ALKPHOSPHAT, TBILIRUBIN, DBILIRUBIN, GAMMAGT, AMYLASE, LIPASE, ALB, PREALBUMIN, GLUCOSE in the last 72 hours.              Lab Results   Component Value Date    TROPONINI 0.02 06/19/2018       Imaging  No results found.      Assessment/Plan:  Anticipate that patient will need greater than 2 midnights for management of the discussed medical issues.    * Acute loss of vision, left   Assessment & Plan    Patient reports previous history of \"retinal bleed\".  He states that he was seen by ophthalmology within the left and had some type of injection.  CT scan of the head is negative, and Dr. Garza of ophthalmology has been consulted.  We will begin to control his blood pressure which is accelerated and medically optimize his other medical comorbidities.  The patient will be admitted to the hospital for close monitoring and I look forward to " Dr. Garza's recommendations.     JOY (acute kidney injury) ([facility])   Assessment & Plan    Acute kidney injury versus potential chronic kidney disease.  The patient has uncontrolled hypertension, uncontrolled hyperlipidemia, and uncontrolled diabetes.  It would not be surprising that he has developed chronic disease.  I will check urine electrolytes and repeat renal function in the morning.     Accelerated hypertension   Assessment & Plan    Secondary to medical noncompliance.  CT scan of the head is negative.  His blood pressure has improved since his arrival to our facility but still poorly controlled.  He did receive clonidine at the outside hospital however I believe this is a poor choice given its rebound potential in a patient with a history of medical noncompliance.  He was previously on lisinopril therefore I will start him on lisinopril and add hydrochlorothiazide.  We will begin to titrate upwards as needed to control his blood pressure.  We will plan for a slow gradual decline to avoid risk of ischemia.  Patient will also be on sodium restriction.  I will check an echocardiogram to assess for development of underlying hypertensive heart disease.     Hypertriglyceridemia- (present on admission)   Assessment & Plan    Treatment as noted above.     Hyperlipidemia- (present on admission)   Assessment & Plan    Patient with both a history of hyper lipidemia as well as hypertriglyceridemia which is untreated.  I will start him on Lipitor and will consider initiation of TriCor at a later time.     Diabetes mellitus type 2, uncontrolled (HCC)- (present on admission)   Assessment & Plan    The patient's blood pressure is elevated in the 300 range, suspect he has been living at this level for quite some time.  He has no evidence of DKA.  I have started him on insulin sliding scale with Accu-Cheks and will monitor for his 24-hour needed.  Once we have an estimation of his insulin need, then we will plan to start him on  long-acting hypoglycemics and request diabetes education.  I will also check an HbA1c.     Tobacco dependence- (present on admission)   Assessment & Plan    This patient continues to smoke cigarettes. 3 minutes were spent counseling the patient in cessation techniques. Patient understands smoking increases risk factors for stroke and death. Patient is open to counseling.  The benefits of stopping were presented and nicotine replacement has been ordered to assist with withdrawal from nicotine during hospitalization and we will continue to encourage cessation and discuss other supportive resources including community groups and prescription medications.         Prophylaxis:   Sequential compression devices for DVT prophylaxis, no PPI indicated, bowel protocol as needed    I spent a total of 35 minutes of non face to face time performing additional research, reviewing medical records from transferring facility, discussing plan of care with other healthcare providers. Start time: 11:00PM. End time: 11:35PM.

## 2019-06-25 NOTE — DISCHARGE PLANNING
Anticipated Discharge Disposition: Home    Action: RN CM assessed pt at bedside.  Pt reports he lives with a roommate and her  in a single story home in [place].  Pt is employed and independent with all ADLs and IADLs.  Pt reports he does not have a PCP, does not have insurance coverage, and makes approximately $2080 per month. Pt will need to be set up with a PCP for follow up at discharge.     Barriers to Discharge: Medical clearance  Pt uninsured; PFA to see for Medicaid application/financial hardship application.     Plan: Await medical clearance.   Set up follow up appointment at discharge with new PCP.     Care Transition Team Assessment    Information Source  Orientation : Oriented x 4  Information Given By: Patient  Informant's Name: Rael  Who is responsible for making decisions for patient? : Patient    Readmission Evaluation  Is this a readmission?: No    Elopement Risk  Legal Hold: No  Ambulatory or Self Mobile in Wheelchair: Yes  Disoriented: No  Psychiatric Symptoms: None  History of Wandering: No  Elopement this Admit: No  Vocalizing Wanting to Leave: No  Displays Behaviors, Body Language Wanting to Leave: No-Not at Risk for Elopement  Elopement Risk: Not at Risk for Elopement    Interdisciplinary Discharge Planning  Primary Care Physician: None  Lives with - Patient's Self Care Capacity: Other (Comments)  Patient or legal guardian wants to designate a caregiver (see row info): No  Housing / Facility: 1 Story House  Able to Return to Previous ADL's: Yes  Mobility Issues: No  Prior Services: Home-Independent, None  Patient Expects to be Discharged to:: Home  Assistance Needed: No  Durable Medical Equipment: Not Applicable    Discharge Preparedness  What is your plan after discharge?: Home with help  What are your discharge supports?: Other (comment) (Roommate)  Prior Functional Level: Ambulatory, Drives Self, Independent with Activities of Daily Living, Independent with Medication  Management  Difficulity with ADLs: None  Difficulity with IADLs: None    Functional Assesment  Prior Functional Level: Ambulatory, Drives Self, Independent with Activities of Daily Living, Independent with Medication Management    Finances  Financial Barriers to Discharge: Yes  Average Monthly Income: 2080 $  Source of Income: Employed  Prescription Coverage: No  Prescription Coverage Comments: No insurance    Vision / Hearing Impairment  Vision Impairment : Yes  Right Eye Vision: Impaired, Patient Declines to Wear Visual Aid  Left Eye Vision: Impaired, Patient Declines to Wear Visual Aid (blurry, retinal hemorrhage)  Hearing Impairment : No         Advance Directive  Advance Directive?: None    Domestic Abuse  Have you ever been the victim of abuse or violence?: No  Physical Abuse or Sexual Abuse: No  Verbal Abuse or Emotional Abuse: No  Possible Abuse Reported to:: Not Applicable         Discharge Risks or Barriers  Discharge risks or barriers?: Uninsured / underinsured, No PCP, Non-adherence to medication or treatment  Patient risk factors: No PCP, Uninsured or underinsured    Anticipated Discharge Information  Anticipated discharge disposition: Home  Discharge Address: [facility] [address]  Discharge Contact Phone Number: [phone]

## 2019-06-25 NOTE — ASSESSMENT & PLAN NOTE
This patient continues to smoke cigarettes. 3 minutes were spent counseling the patient in cessation techniques. Patient understands smoking increases risk factors for stroke and death. Patient is open to counseling.  The benefits of stopping were presented and nicotine replacement has been ordered to assist with withdrawal from nicotine during hospitalization and we will continue to encourage cessation and discuss other supportive resources including community groups and prescription medications.

## 2019-06-25 NOTE — ED NOTES
Pt continues to be resting comfortably, VSS, BP now less than 160 systolic. Floor Charge RN made aware. Awaiting bed assignment.

## 2019-06-25 NOTE — ED NOTES
"Pt states vision is off/on blurry. Worse in L eye than R, pt states supposed to be on corrective lenses but \"it's on my to do list.\"  BP improving. MD aware. No new orders received. VSS. Awaiting bed assignment, pt denies any needs/concerns at this time.   "

## 2019-06-25 NOTE — ASSESSMENT & PLAN NOTE
Acute kidney injury versus potential chronic kidney disease in the light of untreated diabetes mellitus.  Avoid nephrotoxins.  Renal dose all medications per  6/27: Kidney functions deteriorated.  Could be due to hydrochlorothiazide and lisinopril on board.  Will discontinue hydrochlorothiazide and decrease lisinopril dose.  We will hydrate with IV fluids.  Kidney ultrasound showed left simple cyst and right mildly complicated septated cyst.  Likely Bosniak category 2.  Will need outpatient imaging follow-up and monitoring.

## 2019-06-25 NOTE — PROGRESS NOTES
Bedside report received.  Assessment complete.  A&O x 4. Patient calls appropriately.  Patient in bed with no assist. Bed alarm n/a.   Patient has 0/10 pain.   Denies N&V. Tolerating diabetic no salt added diet.  + void, + flatus, - BM.  Patient denies SOB.  SCD's off per order, patient ambulating.  Review plan with of care with patient. Call light and personal belongings with in reach. Hourly rounding in place. All needs met at this time.     Blood pressure not within normal limits, have medicated twice with hydralazine 10mg. Will check BP in an hour and reassess.

## 2019-06-25 NOTE — PROGRESS NOTES
Hospital Medicine Daily Progress Note    Date of Service  6/25/2019    Chief Complaint  44 y.o. male admitted 6/24/2019 with sudden onset of blurred vision on left eye.    Hospital Course  This is a 44 years old male with past medical history of essential hypertension, and type 2 diabetes mellitus not compliant with treatment due to financial difficulties and lack of insurance.  Also history of diabetic retinopathy and left eye retinal hemorrhage in the past comes in with acute onset of blurry vision on the left eye.  Patient stated that he has been off his medications for hypertension and diabetes for almost 5 years due to financial difficulties.  He presented to outside facility where his labs were pertinent for creatinine of 2.12 and blood glucoses of 318 mg/dL.  Was transferred here for higher level of care.  Ophthalmology consulted.  Recommended outpatient follow-up as his vision improved over hospital course.  He was hypertensive upon admission and started on oral blood pressure medications.  Blood pressure was continued to be suboptimally controlled and his medication doses adjusted.  Hemoglobin A1c was 10.0%.  Blood glucoses were better controlled with sliding scale insulin.  Has no means to have insulin at home due to the above-mentioned reasons.  Was started on glipizide during this hospital stay.  Interval Problem Update  Resting comfortably in bed.  Stated that his vision on the left eye has much improved and the blindness is resolving gradually.  Denies any headache.  Blood pressure still suboptimally controlled.  Blood glucoses better controlled.  Tolerating p.o. fairly.  Denies any chest pain or shortness of breath.  No distress noted.    Consultants/Specialty  Ophthalmology    Code Status  Full code    Disposition  Likely home once medically cleared    Review of Systems  Review of Systems   Constitutional: Negative for chills, fever and weight loss.   HENT: Negative for hearing loss and tinnitus.     Eyes: Positive for blurred vision (Sudden onset of blurriness on left eye). Negative for double vision and pain.   Respiratory: Negative for cough and hemoptysis.    Cardiovascular: Negative for chest pain, palpitations and orthopnea.   Gastrointestinal: Negative for heartburn, nausea and vomiting.   Genitourinary: Negative for dysuria and urgency.   Musculoskeletal: Negative for back pain, myalgias and neck pain.   Skin: Negative for rash.   Neurological: Positive for tingling. Negative for dizziness and headaches.   Endo/Heme/Allergies: Does not bruise/bleed easily.   Psychiatric/Behavioral: Negative for depression, substance abuse and suicidal ideas.        Physical Exam  Temp:  [36.4 °C (97.6 °F)-37.2 °C (99 °F)] 37.2 °C (99 °F)  Pulse:  [78-96] 96  Resp:  [15-20] 15  BP: (158-196)/() 169/69  SpO2:  [91 %-97 %] 92 %    Physical Exam   Constitutional: He is oriented to person, place, and time. No distress.   HENT:   Head: Normocephalic and atraumatic.   Mouth/Throat: No oropharyngeal exudate.   Eyes: Pupils are equal, round, and reactive to light. No scleral icterus.   Neck: Normal range of motion. No tracheal deviation present. No thyromegaly present.   Cardiovascular: Normal rate and regular rhythm.  Exam reveals no gallop and no friction rub.    Pulmonary/Chest: Effort normal. No respiratory distress. He has no wheezes.   Abdominal: Soft. He exhibits no distension. There is no tenderness.   Musculoskeletal: He exhibits no tenderness or deformity.   Neurological: He is alert and oriented to person, place, and time.   Skin: Skin is warm and dry. He is not diaphoretic.   Psychiatric: He has a normal mood and affect. His behavior is normal.       Fluids    Intake/Output Summary (Last 24 hours) at 06/25/19 1631  Last data filed at 06/25/19 0900   Gross per 24 hour   Intake              200 ml   Output              120 ml   Net               80 ml       Laboratory  Recent Labs      06/25/19   1046   WBC   "10.4   RBC  4.87   HEMOGLOBIN  14.5   HEMATOCRIT  42.3   MCV  86.9   MCH  29.8   MCHC  34.3   RDW  43.8   PLATELETCT  255   MPV  10.8     Recent Labs      06/25/19   1046   SODIUM  138   POTASSIUM  3.8   CHLORIDE  104   CO2  23   GLUCOSE  253*   BUN  20   CREATININE  1.80*   CALCIUM  8.5                   Imaging  [id number] FILM X-RAY   Final Result      EC-ECHOCARDIOGRAM COMPLETE W/O CONT    (Results Pending)        Assessment/Plan  * Acute loss of vision, left   Assessment & Plan    Patient reports previous history of \"retinal bleed\".  He states that he was seen by ophthalmology within the left and had some type of injection.  CT scan of the head is negative, and Dr. Garza of ophthalmology has been consulted.  We will begin to control his blood pressure which is accelerated and medically optimize his other medical comorbidities.  The patient will be admitted to the hospital for close monitoring and I look forward to Dr. Garza's recommendations.  6/25: His vision much improved.  Ophthalmology recommended outpatient follow-up.     JOY (acute kidney injury) (HCC)   Assessment & Plan    Acute kidney injury versus potential chronic kidney disease in the light of untreated diabetes mellitus.  Avoid nephrotoxins.  Renal dose all medications per     Accelerated hypertension   Assessment & Plan    Secondary to medical noncompliance.  CT scan of the head is negative.  His blood pressure has improved since his arrival to our facility but still poorly controlled.  He did receive clonidine at the outside hospital however I believe this is a poor choice given its rebound potential in a patient with a history of medical noncompliance.  He was previously on lisinopril therefore I will start him on lisinopril and add hydrochlorothiazide.  We will begin to titrate upwards as needed to control his blood pressure.  We will plan for a slow gradual decline to avoid risk of ischemia.  Patient will also be on sodium restriction. "  I will check an echocardiogram to assess for development of underlying hypertensive heart disease.  6/25: Blood pressure still not well controlled.  Blood pressure medications doses adjusted.  Continue to monitor for now.     Hypertriglyceridemia- (present on admission)   Assessment & Plan    Treatment as noted above.     Hyperlipidemia- (present on admission)   Assessment & Plan    Patient with both a history of hyper lipidemia as well as hypertriglyceridemia which is untreated.  I will start him on Lipitor and will consider initiation of TriCor at a later time.     Diabetes mellitus type 2, uncontrolled (HCC)- (present on admission)   Assessment & Plan    The patient's blood pressure is elevated in the 300 range, suspect he has been living at this level for quite some time.  He has no evidence of DKA.    6/25: Hemoglobin A1c 10.0 %.  Patient has been off his diabetes medications for 5 years due to financial difficulties.  He does not have insurance.  Will be poor candidate for insulin at this point.  I started glipizide 5 mg p.o. twice daily.  Monitor blood glucoses for now.  Continue with sliding scale insulin.     Tobacco dependence- (present on admission)   Assessment & Plan    This patient continues to smoke cigarettes. 3 minutes were spent counseling the patient in cessation techniques. Patient understands smoking increases risk factors for stroke and death. Patient is open to counseling.  The benefits of stopping were presented and nicotine replacement has been ordered to assist with withdrawal from nicotine during hospitalization and we will continue to encourage cessation and discuss other supportive resources including community groups and prescription medications.          VTE prophylaxis: SCDs

## 2019-06-25 NOTE — ED PROVIDER NOTES
"ED Provider Note    CHIEF COMPLAINT  Chief Complaint   Patient presents with   • Hypertension       HPI  Real Perry is a 44 y.o. male who presents for evaluation of decreased vision in the left eye since Saturday.  Patient describes a feeling of blurriness in the central vision area which started on Saturday which she thinks is related to another \"retinal bleed\" which has happened in the past.  Patient is a known hypertensive and diabetic but does not take any medications due to losing his insurance.  He has not been on any medications for 10 years.  Patient was transferred from an outside facility for hypertensive emergency and ophthalmology consultation.  Currently patient has a sensation of blurry vision in the left central fields and cannot read an eye chart with this eye.  He states this is worsened since Saturday.  He has no chest pain, shortness of breath, back pain, or neck pain.  He has not had any recent trauma and describes no other neurologic symptoms    REVIEW OF SYSTEMS  Constitutional: No fevers or chills  Skin: No rashes  HEENT: No ear pain, ringing in ears, or decreased hearing. No sore throat, runny nose, sores, trouble swallowing, trouble speaking.  Neck: No neck pain, stiffness, or masses.  Chest: No pain or rashes  Pulm: No shortness of breath, cough  Gastrointestinal: No nausea, vomiting, diarrhea  Genitourinary: No dysuria or hematuria  Musculoskeletal: No recent trauma, pain, swelling, weakness  Neurologic: No sensory or motor changes. No confusion or disorientation.  Heme: No bleeding or bruising problems.   Immuno: No hx of recurrent infections      PAST MEDICAL HISTORY   has a past medical history of Diabetes and Hypertension.    SOCIAL HISTORY  Social History     Social History Main Topics   • Smoking status: Current Every Day Smoker     Packs/day: 1.00     Years: 22.00     Types: Cigarettes   • Smokeless tobacco: Never Used   • Alcohol use Yes      Comment: 1-2 year   • Drug " "use: No   • Sexual activity: Yes     Partners: Female       SURGICAL HISTORY   has a past surgical history that includes open reduction.    CURRENT MEDICATIONS  Home Medications     Reviewed by Meme Tillman (Pharmacy Tech) on 06/24/19 at 2359  Med List Status: Complete   Medication Last Dose Status   therapeutic multivitamin-minerals (THERAGRAN-M) Tab 6/24/2019 Active                ALLERGIES  No Known Allergies    PHYSICAL EXAM  VITAL SIGNS: BP (!) 162/92   Pulse 83   Temp 36.7 °C (98.1 °F) (Temporal)   Resp 19   Ht 1.753 m (5' 9\")   Wt 124.6 kg (274 lb 11.1 oz)   SpO2 94%   BMI 40.57 kg/m²    Gen: Alert in no apparent distress.  HEENT: No signs of trauma, Bilateral external ears normal, Nose normal. Conjunctiva normal, Non-icteric.  Pupils equal round reactive to light and accommodation, extraocular eye movement intact, no periorbital swelling or lid swelling.  Red reflex intact to the right eye and diminished on the left eye.  Unable to view left retina at all.  Cardiovascular: Regular rate and rhythm, no murmurs.   Thorax & Lungs: Normal breath sounds, No respiratory distress, No wheezing bilateral chest rise  Abdomen: Bowel sounds normal, Soft, No tenderness, No masses, No pulsatile masses. No Guarding or rebound  Skin: Warm, Dry, No erythema, No rash.   Extremities: Intact distal pulses, No edema  Neurologic: Alert , no facial droop, grossly normal coordination and strength  Psychiatric: Affect normal, Judgment normal, Mood normal.       INITIAL IMPRESSION  Patient arrives for evaluation of left eye visual changes which are likely related to be the vitreous or retinal hemorrhage.  This is an acute on chronic for him and likely due to medication noncompliance both in terms of his diabetes and his hypertension.  I will contact ophthalmology for consultation and likely need to admit the patient for hypertensive urgency/emergency related to his retinopathy.    LABS  Labs reviewed from outside facility " done today 3:00 in the afternoon, significant for glucose of 318, creatinine of 2.12 with a GFR of 36.    COURSE & MEDICAL DECISION MAKING  Pertinent Labs & Imaging studies reviewed. (See chart for details)  Patient arrives with symptoms suggestive of either vitreous or retinal hemorrhage as a result of his hypertension and/or diabetes.  Patient remained hypertensive however had improved to a significant degree after treatment at the initial facility and in route by EMS.  He was in no distress on my evaluation but still had left eye central vision loss.  This had not changed since Saturday however.  Patient was discussed with the ophthalmologist who will see the patient in consultation tomorrow in the hospital in the meantime, he will be admitted to the hospital service for treatment of his hypertension and diabetes.    FINAL IMPRESSION  1. Hypertensive urgency    2. Vision loss of left eye    3. Hyperglycemia    4. Renal insufficiency        Electronically signed by: Keshav Jospeh, 6/24/2019 10:52 PM

## 2019-06-25 NOTE — DIETARY
NUTRITION SERVICES: BMI - Pt with BMI >40 (=Body mass index is 40.57 kg/m².), class III (extreme) obesity. Weight loss counseling not appropriate in acute care setting. RECOMMEND - Referral to outpatient nutrition services for weight management after D/C.

## 2019-06-25 NOTE — ED NOTES
Med rec updated and complete. Allergies reviewed. Pt denies prescription medications. No oral antibiotic use in last 14 days  Home pharmacy [facility]

## 2019-06-25 NOTE — PROGRESS NOTES
"Paged Dr. Winston about updates:    Patient arrived to unit with a blood pressure of 196/114. Treated with prn hydralazine. Patient repeat blood pressure was 169/104. Treated again with prn hydralazine repeat dose 1 hour after 1 time. Recheck BP was 162/ 92 with no more PRN orders.   -MD did not want anymore PRN BP meds added to the MAR.  Clonidine patch present upon assessment with no documentation.  -Orders to remove patch because the patch is from an outside facility  Inguinal hernia present with no documentation or history recorded. This RN asked if MD wanted to address it at this time. MD states \"he can't even afford the medication and treatment for his blood pressure, so we will treat his hypertension and diabetes for now and might address it in a day or two.\"  "

## 2019-06-25 NOTE — ED TRIAGE NOTES
Pt transferred from [facility] for further evaluation & treatment of uncontrolled HTN & L eye retinal bleeding. (sent to see opthalmology) Pt states no vision changes at this time. Pt received clindamycin 300 mg 2045, clonidine patch 0.1 mg 1, 0.1 mg clonidine PO pta at facility & Labetalol 20 mg at 2045 by EMS pta. BP down to 177/112 per EMS after intervention from initial systolic 230. Has been off all meds at least 5 years- only takes multivitamin.

## 2019-06-25 NOTE — ASSESSMENT & PLAN NOTE
"Patient reports previous history of \"retinal bleed\".  He states that he was seen by ophthalmology within the left and had some type of injection.  CT scan of the head is negative, and Dr. Garza of ophthalmology has been consulted.  We will begin to control his blood pressure which is accelerated and medically optimize his other medical comorbidities.  The patient will be admitted to the hospital for close monitoring and I look forward to Dr. Garza's recommendations.  6/25: His vision much improved.  Ophthalmology recommended outpatient follow-up.  6/26: Continues to improve.  "

## 2019-06-25 NOTE — ASSESSMENT & PLAN NOTE
Secondary to medical noncompliance.  CT scan of the head is negative.  His blood pressure has improved since his arrival to our facility but still poorly controlled.  He did receive clonidine at the outside hospital however I believe this is a poor choice given its rebound potential in a patient with a history of medical noncompliance.  He was previously on lisinopril therefore I will start him on lisinopril and add hydrochlorothiazide.  We will begin to titrate upwards as needed to control his blood pressure.  We will plan for a slow gradual decline to avoid risk of ischemia.  Patient will also be on sodium restriction.  I will check an echocardiogram to assess for development of underlying hypertensive heart disease.  6/25: Blood pressure still not well controlled.  Blood pressure medications doses adjusted.  Continue to monitor for now.  6/26: Blood pressure still not well controlled.  Amlodipine 5 mg p.o. daily added.  Continue to monitor.  6/27: Blood pressure is well controlled but kidney functions deteriorated.  Will decrease lisinopril dose and discontinue hydrochlorothiazide.  Will increase amlodipine dose to 10 mg daily.  Will monitor blood pressure closely.  Echocardiogram showed moderate left ventricular hypertrophy.  I also recommend outpatient sleep apnea work-up considering his obesity as well.

## 2019-06-25 NOTE — PROGRESS NOTES
Bedside report received.  Assessment complete.  A&O x 4. Patient calls appropriately.  Patient up with no assist.   Patient has 0/10 pain.   Denies N&V. Tolerating diabetic, no sodium diet.  Hernia present, will update MD.  + void, + flatus, + BM.  Patient denies SOB.  SCD's on.  Patient in pleasant mood, need more BP meds for control, will page MD.  Review plan with of care with patient. Call light and personal belongings with in reach. Hourly rounding in place. All needs met at this time.

## 2019-06-26 LAB
ANION GAP SERPL CALC-SCNC: 11 MMOL/L (ref 0–11.9)
BUN SERPL-MCNC: 20 MG/DL (ref 8–22)
CALCIUM SERPL-MCNC: 8.6 MG/DL (ref 8.5–10.5)
CHLORIDE SERPL-SCNC: 106 MMOL/L (ref 96–112)
CO2 SERPL-SCNC: 23 MMOL/L (ref 20–33)
CREAT SERPL-MCNC: 1.85 MG/DL (ref 0.5–1.4)
ERYTHROCYTE [DISTWIDTH] IN BLOOD BY AUTOMATED COUNT: 45.4 FL (ref 35.9–50)
GLUCOSE BLD-MCNC: 142 MG/DL (ref 65–99)
GLUCOSE BLD-MCNC: 150 MG/DL (ref 65–99)
GLUCOSE BLD-MCNC: 80 MG/DL (ref 65–99)
GLUCOSE BLD-MCNC: 87 MG/DL (ref 65–99)
GLUCOSE SERPL-MCNC: 125 MG/DL (ref 65–99)
HCT VFR BLD AUTO: 43.3 % (ref 42–52)
HGB BLD-MCNC: 14.5 G/DL (ref 14–18)
MCH RBC QN AUTO: 29.7 PG (ref 27–33)
MCHC RBC AUTO-ENTMCNC: 33.5 G/DL (ref 33.7–35.3)
MCV RBC AUTO: 88.7 FL (ref 81.4–97.8)
PLATELET # BLD AUTO: 250 K/UL (ref 164–446)
PMV BLD AUTO: 10.8 FL (ref 9–12.9)
POTASSIUM SERPL-SCNC: 3.3 MMOL/L (ref 3.6–5.5)
RBC # BLD AUTO: 4.88 M/UL (ref 4.7–6.1)
SODIUM SERPL-SCNC: 140 MMOL/L (ref 135–145)
WBC # BLD AUTO: 11.6 K/UL (ref 4.8–10.8)

## 2019-06-26 PROCEDURE — 80048 BASIC METABOLIC PNL TOTAL CA: CPT

## 2019-06-26 PROCEDURE — A9270 NON-COVERED ITEM OR SERVICE: HCPCS | Performed by: HOSPITALIST

## 2019-06-26 PROCEDURE — 700111 HCHG RX REV CODE 636 W/ 250 OVERRIDE (IP): Performed by: HOSPITALIST

## 2019-06-26 PROCEDURE — 99233 SBSQ HOSP IP/OBS HIGH 50: CPT | Performed by: FAMILY MEDICINE

## 2019-06-26 PROCEDURE — A9270 NON-COVERED ITEM OR SERVICE: HCPCS | Performed by: FAMILY MEDICINE

## 2019-06-26 PROCEDURE — 700102 HCHG RX REV CODE 250 W/ 637 OVERRIDE(OP): Performed by: FAMILY MEDICINE

## 2019-06-26 PROCEDURE — 85027 COMPLETE CBC AUTOMATED: CPT

## 2019-06-26 PROCEDURE — 770006 HCHG ROOM/CARE - MED/SURG/GYN SEMI*

## 2019-06-26 PROCEDURE — 36415 COLL VENOUS BLD VENIPUNCTURE: CPT

## 2019-06-26 PROCEDURE — 700102 HCHG RX REV CODE 250 W/ 637 OVERRIDE(OP): Performed by: HOSPITALIST

## 2019-06-26 PROCEDURE — 82962 GLUCOSE BLOOD TEST: CPT | Mod: 91

## 2019-06-26 RX ORDER — POTASSIUM CHLORIDE 20 MEQ/1
40 TABLET, EXTENDED RELEASE ORAL ONCE
Status: COMPLETED | OUTPATIENT
Start: 2019-06-26 | End: 2019-06-26

## 2019-06-26 RX ORDER — AMLODIPINE BESYLATE 10 MG/1
5 TABLET ORAL
Status: DISCONTINUED | OUTPATIENT
Start: 2019-06-26 | End: 2019-06-27

## 2019-06-26 RX ADMIN — LISINOPRIL 40 MG: 20 TABLET ORAL at 04:48

## 2019-06-26 RX ADMIN — GLIPIZIDE 5 MG: 10 TABLET ORAL at 17:48

## 2019-06-26 RX ADMIN — POTASSIUM CHLORIDE 40 MEQ: 1500 TABLET, EXTENDED RELEASE ORAL at 09:31

## 2019-06-26 RX ADMIN — ATORVASTATIN CALCIUM 40 MG: 40 TABLET, FILM COATED ORAL at 17:47

## 2019-06-26 RX ADMIN — GLIPIZIDE 5 MG: 10 TABLET ORAL at 04:49

## 2019-06-26 RX ADMIN — HYDROCHLOROTHIAZIDE 25 MG: 25 TABLET ORAL at 04:49

## 2019-06-26 RX ADMIN — AMLODIPINE BESYLATE 5 MG: 10 TABLET ORAL at 09:32

## 2019-06-26 RX ADMIN — HYDRALAZINE HYDROCHLORIDE 10 MG: 20 INJECTION INTRAMUSCULAR; INTRAVENOUS at 11:25

## 2019-06-26 ASSESSMENT — ENCOUNTER SYMPTOMS
MYALGIAS: 0
NECK PAIN: 0
VOMITING: 0
NAUSEA: 0
HEMOPTYSIS: 0
BRUISES/BLEEDS EASILY: 0
PHOTOPHOBIA: 0
WEIGHT LOSS: 0
BACK PAIN: 0
BLURRED VISION: 1
HEARTBURN: 0
DEPRESSION: 0
TINGLING: 0
ABDOMINAL PAIN: 0
CLAUDICATION: 0
SENSORY CHANGE: 0
TREMORS: 0
COUGH: 0
CHILLS: 0
DIZZINESS: 0
FEVER: 0
DOUBLE VISION: 0
EYE PAIN: 0
HEADACHES: 0
ORTHOPNEA: 0
PALPITATIONS: 0

## 2019-06-26 ASSESSMENT — LIFESTYLE VARIABLES: SUBSTANCE_ABUSE: 0

## 2019-06-26 NOTE — DISCHARGE PLANNING
Anticipated Discharge Disposition: Home    Action: RN CM provided packet with information on [facility] for outpatient follow up at discharge.  Pt needs to call and schedule follow up appointment prior to discharge.     Barriers to Discharge: Medical clearance    Plan: Discharge home with no needs.

## 2019-06-26 NOTE — PROGRESS NOTES
Bedside report received.   Assessment complete.  A&O x 4. Patient calls appropriately.  Patient in bed with no assist. Bed alarm n/a.   Patient has 0/10 pain.   Denies N&V. Tolerating diabetic diet.  + void, + flatus, + BM.  Patient denies SOB.  Patient still has high blood pressure upon first set of vitals, medicated per MAR with prn hydralazine.  Review plan with of care with patient. Call light and personal belongings with in reach. Hourly rounding in place. All needs met at this time.

## 2019-06-26 NOTE — CARE PLAN
Problem: Communication  Goal: The ability to communicate needs accurately and effectively will improve  Outcome: PROGRESSING AS EXPECTED  Patient communicating effectively with staff about pain, questions, and other concerns with treatment. All questions answered.     Problem: Safety  Goal: Will remain free from falls  Outcome: PROGRESSING AS EXPECTED  Patient's call light within reach, bed in lowest and locked position, threaded socks on, no assistance needed.

## 2019-06-26 NOTE — PROGRESS NOTES
Bedside report received.  Assessment complete.  A&O x 4. Patient calls appropriately.  Patient up with no assist.   Patient has 0/10 pain.   Denies N&V. Tolerating diabetic diet.  BP more stabilized this Am.  + void, + flatus, + BM.  Patient denies SOB.  Patient in pleasant mood.  Review plan with of care with patient. Call light and personal belongings with in reach. Hourly rounding in place. All needs met at this time.

## 2019-06-27 ENCOUNTER — APPOINTMENT (OUTPATIENT)
Dept: CARDIOLOGY | Facility: MEDICAL CENTER | Age: 44
DRG: 305 | End: 2019-06-27
Attending: HOSPITALIST

## 2019-06-27 ENCOUNTER — APPOINTMENT (OUTPATIENT)
Dept: RADIOLOGY | Facility: MEDICAL CENTER | Age: 44
DRG: 305 | End: 2019-06-27
Attending: FAMILY MEDICINE

## 2019-06-27 PROBLEM — I11.9 CARDIOMYOPATHY DUE TO HYPERTENSION, WITHOUT HEART FAILURE (HCC): Status: ACTIVE | Noted: 2019-06-27

## 2019-06-27 PROBLEM — E66.813 CLASS 3 SEVERE OBESITY DUE TO EXCESS CALORIES WITH BODY MASS INDEX (BMI) OF 40.0 TO 44.9 IN ADULT (HCC): Status: ACTIVE | Noted: 2019-06-27

## 2019-06-27 PROBLEM — N20.0 KIDNEY STONE: Status: ACTIVE | Noted: 2019-06-27

## 2019-06-27 PROBLEM — E66.01 CLASS 3 SEVERE OBESITY DUE TO EXCESS CALORIES WITH BODY MASS INDEX (BMI) OF 40.0 TO 44.9 IN ADULT (HCC): Status: ACTIVE | Noted: 2019-06-27

## 2019-06-27 PROBLEM — I43 CARDIOMYOPATHY DUE TO HYPERTENSION, WITHOUT HEART FAILURE (HCC): Status: ACTIVE | Noted: 2019-06-27

## 2019-06-27 LAB
ALBUMIN SERPL BCP-MCNC: 3 G/DL (ref 3.2–4.9)
ALBUMIN/GLOB SERPL: 1.1 G/DL
ALP SERPL-CCNC: 52 U/L (ref 30–99)
ALT SERPL-CCNC: 12 U/L (ref 2–50)
ANION GAP SERPL CALC-SCNC: 9 MMOL/L (ref 0–11.9)
AST SERPL-CCNC: 19 U/L (ref 12–45)
BILIRUB SERPL-MCNC: 0.5 MG/DL (ref 0.1–1.5)
BUN SERPL-MCNC: 27 MG/DL (ref 8–22)
CALCIUM SERPL-MCNC: 8.4 MG/DL (ref 8.5–10.5)
CHLORIDE SERPL-SCNC: 104 MMOL/L (ref 96–112)
CO2 SERPL-SCNC: 24 MMOL/L (ref 20–33)
CREAT SERPL-MCNC: 2.25 MG/DL (ref 0.5–1.4)
GLOBULIN SER CALC-MCNC: 2.7 G/DL (ref 1.9–3.5)
GLUCOSE BLD-MCNC: 117 MG/DL (ref 65–99)
GLUCOSE BLD-MCNC: 122 MG/DL (ref 65–99)
GLUCOSE BLD-MCNC: 137 MG/DL (ref 65–99)
GLUCOSE BLD-MCNC: 96 MG/DL (ref 65–99)
GLUCOSE SERPL-MCNC: 101 MG/DL (ref 65–99)
LV EJECT FRACT  99904: 55
LV EJECT FRACT MOD 2C 99903: 42.07
LV EJECT FRACT MOD 4C 99902: 55.81
LV EJECT FRACT MOD BP 99901: 52.13
POTASSIUM SERPL-SCNC: 3.6 MMOL/L (ref 3.6–5.5)
PROT SERPL-MCNC: 5.7 G/DL (ref 6–8.2)
SODIUM SERPL-SCNC: 137 MMOL/L (ref 135–145)

## 2019-06-27 PROCEDURE — 36415 COLL VENOUS BLD VENIPUNCTURE: CPT

## 2019-06-27 PROCEDURE — 770006 HCHG ROOM/CARE - MED/SURG/GYN SEMI*

## 2019-06-27 PROCEDURE — 80053 COMPREHEN METABOLIC PANEL: CPT

## 2019-06-27 PROCEDURE — 76775 US EXAM ABDO BACK WALL LIM: CPT

## 2019-06-27 PROCEDURE — 700102 HCHG RX REV CODE 250 W/ 637 OVERRIDE(OP): Performed by: HOSPITALIST

## 2019-06-27 PROCEDURE — 93306 TTE W/DOPPLER COMPLETE: CPT | Mod: 26 | Performed by: INTERNAL MEDICINE

## 2019-06-27 PROCEDURE — 700105 HCHG RX REV CODE 258: Performed by: FAMILY MEDICINE

## 2019-06-27 PROCEDURE — 82962 GLUCOSE BLOOD TEST: CPT

## 2019-06-27 PROCEDURE — 700102 HCHG RX REV CODE 250 W/ 637 OVERRIDE(OP): Performed by: FAMILY MEDICINE

## 2019-06-27 PROCEDURE — 93306 TTE W/DOPPLER COMPLETE: CPT

## 2019-06-27 PROCEDURE — 99233 SBSQ HOSP IP/OBS HIGH 50: CPT | Performed by: FAMILY MEDICINE

## 2019-06-27 PROCEDURE — A9270 NON-COVERED ITEM OR SERVICE: HCPCS | Performed by: FAMILY MEDICINE

## 2019-06-27 PROCEDURE — A9270 NON-COVERED ITEM OR SERVICE: HCPCS | Performed by: HOSPITALIST

## 2019-06-27 RX ORDER — SODIUM CHLORIDE 9 MG/ML
INJECTION, SOLUTION INTRAVENOUS CONTINUOUS
Status: DISCONTINUED | OUTPATIENT
Start: 2019-06-27 | End: 2019-06-28 | Stop reason: HOSPADM

## 2019-06-27 RX ORDER — AMLODIPINE BESYLATE 10 MG/1
10 TABLET ORAL
Status: DISCONTINUED | OUTPATIENT
Start: 2019-06-27 | End: 2019-06-28 | Stop reason: HOSPADM

## 2019-06-27 RX ORDER — LISINOPRIL 10 MG/1
10 TABLET ORAL
Status: DISCONTINUED | OUTPATIENT
Start: 2019-06-27 | End: 2019-06-28 | Stop reason: HOSPADM

## 2019-06-27 RX ORDER — LISINOPRIL 20 MG/1
20 TABLET ORAL
Status: DISCONTINUED | OUTPATIENT
Start: 2019-06-27 | End: 2019-06-27

## 2019-06-27 RX ADMIN — HYDROCHLOROTHIAZIDE 25 MG: 25 TABLET ORAL at 06:00

## 2019-06-27 RX ADMIN — SODIUM CHLORIDE: 9 INJECTION, SOLUTION INTRAVENOUS at 11:36

## 2019-06-27 RX ADMIN — ATORVASTATIN CALCIUM 40 MG: 40 TABLET, FILM COATED ORAL at 18:27

## 2019-06-27 RX ADMIN — AMLODIPINE BESYLATE 5 MG: 10 TABLET ORAL at 05:12

## 2019-06-27 RX ADMIN — GLIPIZIDE 5 MG: 10 TABLET ORAL at 08:53

## 2019-06-27 RX ADMIN — SODIUM CHLORIDE: 9 INJECTION, SOLUTION INTRAVENOUS at 18:49

## 2019-06-27 RX ADMIN — AMLODIPINE BESYLATE 10 MG: 10 TABLET ORAL at 11:35

## 2019-06-27 RX ADMIN — LISINOPRIL 10 MG: 10 TABLET ORAL at 11:35

## 2019-06-27 RX ADMIN — LISINOPRIL 40 MG: 20 TABLET ORAL at 05:11

## 2019-06-27 RX ADMIN — GLIPIZIDE 5 MG: 10 TABLET ORAL at 18:26

## 2019-06-27 ASSESSMENT — ENCOUNTER SYMPTOMS
DIARRHEA: 0
HALLUCINATIONS: 0
HEARTBURN: 0
DIZZINESS: 0
SENSORY CHANGE: 0
CHILLS: 0
DOUBLE VISION: 0
VOMITING: 0
COUGH: 0
SPEECH CHANGE: 0
WEIGHT LOSS: 0
HEADACHES: 0
BACK PAIN: 0
CLAUDICATION: 0
ABDOMINAL PAIN: 0
PALPITATIONS: 0
FEVER: 0
PHOTOPHOBIA: 0
ORTHOPNEA: 0
DEPRESSION: 0
NECK PAIN: 0
EYE PAIN: 0
TREMORS: 0
TINGLING: 0
NAUSEA: 0
MYALGIAS: 0
BLURRED VISION: 1
BRUISES/BLEEDS EASILY: 0
HEMOPTYSIS: 0

## 2019-06-27 ASSESSMENT — LIFESTYLE VARIABLES: SUBSTANCE_ABUSE: 0

## 2019-06-27 NOTE — PROGRESS NOTES
Bedside report received.  Assessment complete.  A&O x 4. Patient calls appropriately.  Patient up with no assist.   Patient has 0/10 pain.   Denies N&V. Tolerating diabetic diet.  Blood pressure finally stabilizing and staying under . GFR decreased to 32, will update MD.  + void, + flatus, + BM.  Patient denies SOB.  SCD's refused.  Patient in pleasant mood, need to discuss insurance with SW.  Review plan with of care with patient. Call light and personal belongings with in reach. Hourly rounding in place. All needs met at this time.

## 2019-06-27 NOTE — ASSESSMENT & PLAN NOTE
Kidney ultrasound showed 8.3 mm nonobstructive left kidney stone.  Outpatient urology follow-up recommended.

## 2019-06-27 NOTE — PROGRESS NOTES
Hospital Medicine Daily Progress Note    Date of Service  6/26/2019    Chief Complaint  44 y.o. male admitted 6/24/2019 with sudden onset of blurred vision on left eye.    Hospital Course  This is a 44 years old male with past medical history of essential hypertension, and type 2 diabetes mellitus not compliant with treatment due to financial difficulties and lack of insurance.  Also history of diabetic retinopathy and left eye retinal hemorrhage in the past comes in with acute onset of blurry vision on the left eye.  Patient stated that he has been off his medications for hypertension and diabetes for almost 5 years due to financial difficulties.  He presented to outside facility where his labs were pertinent for creatinine of 2.12 and blood glucoses of 318 mg/dL.  Was transferred here for higher level of care.  Ophthalmology consulted.  Recommended outpatient follow-up as his vision improved over hospital course.  He was hypertensive upon admission and started on oral blood pressure medications.  Blood pressure was continued to be suboptimally controlled and his medication doses adjusted.  Hemoglobin A1c was 10.0%.  Blood glucoses were better controlled with sliding scale insulin.  Has no means to have insulin at home due to the above-mentioned reasons.  Was started on glipizide during this hospital stay.  Interval Problem Update  Resting comfortably in bed.  Stated that his vision on the left eye has much improved and the blindness is resolving gradually.  Denies any headache.  Blood pressure still suboptimally controlled.  Blood glucoses better controlled.  Tolerating p.o. fairly.  Denies any chest pain or shortness of breath.  No distress noted.  6/26: Resting comfortably in bed.  Stated that vision on the left eye has much improved.  Blood glucoses much improved with glipizide.  Blood pressure still poorly controlled.  Amlodipine was added.  Denies any chest pain or shortness of breath.  No acute distress  noted.  Blood [place] in the room down the gee.  Consultants/Specialty  Ophthalmology    Code Status  Full code    Disposition  Likely home once medically cleared    Review of Systems  Review of Systems   Constitutional: Negative for chills, fever, malaise/fatigue and weight loss.   HENT: Negative for hearing loss and tinnitus.    Eyes: Positive for blurred vision (Sudden onset of blurriness on left eye.  Resolving.). Negative for double vision, photophobia and pain.   Respiratory: Negative for cough and hemoptysis.    Cardiovascular: Negative for chest pain, palpitations, orthopnea and claudication.   Gastrointestinal: Negative for abdominal pain, heartburn, nausea and vomiting.   Genitourinary: Negative for dysuria, frequency and urgency.   Musculoskeletal: Negative for back pain, joint pain, myalgias and neck pain.   Skin: Negative for rash.   Neurological: Negative for dizziness, tingling, tremors, sensory change and headaches.   Endo/Heme/Allergies: Does not bruise/bleed easily.   Psychiatric/Behavioral: Negative for depression, substance abuse and suicidal ideas.        Physical Exam  Temp:  [36.6 °C (97.9 °F)-37.3 °C (99.1 °F)] 36.9 °C (98.4 °F)  Pulse:  [] 99  Resp:  [16-18] 18  BP: (149-173)/(80-96) 149/91  SpO2:  [92 %-95 %] 93 %    Physical Exam   Constitutional: He is oriented to person, place, and time. No distress.   HENT:   Head: Normocephalic and atraumatic.   Mouth/Throat: No oropharyngeal exudate.   Eyes: Pupils are equal, round, and reactive to light. Right eye exhibits no discharge. Left eye exhibits no discharge. No scleral icterus.   Neck: Normal range of motion. No tracheal deviation present. No thyromegaly present.   Cardiovascular: Normal rate and regular rhythm.  Exam reveals no gallop and no friction rub.    Pulmonary/Chest: Effort normal. No respiratory distress. He has no wheezes.   Abdominal: Soft. He exhibits no distension. There is no tenderness. There is no rebound.  "  Musculoskeletal: He exhibits no tenderness or deformity.   Neurological: He is alert and oriented to person, place, and time.   Skin: Skin is warm and dry. He is not diaphoretic. No erythema.   Psychiatric: He has a normal mood and affect. His behavior is normal. Thought content normal.       Fluids    Intake/Output Summary (Last 24 hours) at 06/26/19 1724  Last data filed at 06/26/19 1230   Gross per 24 hour   Intake              660 ml   Output                0 ml   Net              660 ml       Laboratory  Recent Labs      06/25/19   1046  06/26/19   0417   WBC  10.4  11.6*   RBC  4.87  4.88   HEMOGLOBIN  14.5  14.5   HEMATOCRIT  42.3  43.3   MCV  86.9  88.7   MCH  29.8  29.7   MCHC  34.3  33.5*   RDW  43.8  45.4   PLATELETCT  255  250   MPV  10.8  10.8     Recent Labs      06/25/19   1046  06/26/19   0417   SODIUM  138  140   POTASSIUM  3.8  3.3*   CHLORIDE  104  106   CO2  23  23   GLUCOSE  253*  125*   BUN  20  20   CREATININE  1.80*  1.85*   CALCIUM  8.5  8.6                   Imaging  [id number] FILM X-RAY   Final Result      EC-ECHOCARDIOGRAM COMPLETE W/O CONT    (Results Pending)        Assessment/Plan  * Acute loss of vision, left   Assessment & Plan    Patient reports previous history of \"retinal bleed\".  He states that he was seen by ophthalmology within the left and had some type of injection.  CT scan of the head is negative, and Dr. Garza of ophthalmology has been consulted.  We will begin to control his blood pressure which is accelerated and medically optimize his other medical comorbidities.  The patient will be admitted to the hospital for close monitoring and I look forward to Dr. Garza's recommendations.  6/25: His vision much improved.  Ophthalmology recommended outpatient follow-up.  6/26: Continues to improve.     JOY (acute kidney injury) ([facility])   Assessment & Plan    Acute kidney injury versus potential chronic kidney disease in the light of untreated diabetes mellitus.  Avoid " nephrotoxins.  Renal dose all medications per     Accelerated hypertension   Assessment & Plan    Secondary to medical noncompliance.  CT scan of the head is negative.  His blood pressure has improved since his arrival to our facility but still poorly controlled.  He did receive clonidine at the outside hospital however I believe this is a poor choice given its rebound potential in a patient with a history of medical noncompliance.  He was previously on lisinopril therefore I will start him on lisinopril and add hydrochlorothiazide.  We will begin to titrate upwards as needed to control his blood pressure.  We will plan for a slow gradual decline to avoid risk of ischemia.  Patient will also be on sodium restriction.  I will check an echocardiogram to assess for development of underlying hypertensive heart disease.  6/25: Blood pressure still not well controlled.  Blood pressure medications doses adjusted.  Continue to monitor for now.  6/26: Blood pressure still not well controlled.  Amlodipine 5 mg p.o. daily added.  Continue to monitor.     Hypertriglyceridemia- (present on admission)   Assessment & Plan    Treatment as noted above.     Hyperlipidemia- (present on admission)   Assessment & Plan    Patient with both a history of hyper lipidemia as well as hypertriglyceridemia which is untreated.  I will start him on Lipitor and will consider initiation of TriCor at a later time.     Diabetes mellitus type 2, uncontrolled (HCC)- (present on admission)   Assessment & Plan    The patient's blood pressure is elevated in the 300 range, suspect he has been living at this level for quite some time.  He has no evidence of DKA.    6/25: Hemoglobin A1c 10.0 %.  Patient has been off his diabetes medications for 5 years due to financial difficulties.  He does not have insurance.  Will be poor candidate for insulin at this point.  I started glipizide 5 mg p.o. twice daily.  Monitor blood glucoses for now.  Continue with  sliding scale insulin.  6/26: Blood glucoses much improved with glipizide.  Continue to monitor for now.     Tobacco dependence- (present on admission)   Assessment & Plan    This patient continues to smoke cigarettes. 3 minutes were spent counseling the patient in cessation techniques. Patient understands smoking increases risk factors for stroke and death. Patient is open to counseling.  The benefits of stopping were presented and nicotine replacement has been ordered to assist with withdrawal from nicotine during hospitalization and we will continue to encourage cessation and discuss other supportive resources including community groups and prescription medications.     Plan of care discussed with multidisciplinary team during rounds.    VTE prophylaxis: SCDs

## 2019-06-27 NOTE — PROGRESS NOTES
"Assumed care of pt at shift change, report received from day shift RN  Pt A & O x 4, no complaints of pain at this time  /81   Pulse 94   Temp 36.5 °C (97.7 °F) (Temporal)   Resp 18   Ht 1.753 m (5' 9\")   Wt 124.6 kg (274 lb 11.1 oz)   SpO2 95%   BMI 40.57 kg/m²     PT on diabetic, no added salt diet  Pt is up self, has been ambulating  Pt's last BM was PTA, urinating fine  20G LAC saline locked in place  Call light within reach, pt calls for assistance  "

## 2019-06-27 NOTE — ASSESSMENT & PLAN NOTE
Due to poorly controlled hypertension.  Will need good blood pressure pressure control.  Clinically euvolemic.

## 2019-06-27 NOTE — PROGRESS NOTES
Hospital Medicine Daily Progress Note    Date of Service  6/27/2019    Chief Complaint  44 y.o. male admitted 6/24/2019 with sudden onset of blurred vision on left eye.    Hospital Course  This is a 44 years old male with past medical history of essential hypertension, and type 2 diabetes mellitus not compliant with treatment due to financial difficulties and lack of insurance.  Also history of diabetic retinopathy and left eye retinal hemorrhage in the past comes in with acute onset of blurry vision on the left eye.  Patient stated that he has been off his medications for hypertension and diabetes for almost 5 years due to financial difficulties.  He presented to outside facility where his labs were pertinent for creatinine of 2.12 and blood glucoses of 318 mg/dL.  Was transferred here for higher level of care.  Ophthalmology consulted.  Recommended outpatient follow-up as his vision improved over hospital course.  He was hypertensive upon admission and started on oral blood pressure medications.  Blood pressure was continued to be suboptimally controlled and his medication doses adjusted.  Hemoglobin A1c was 10.0%.  Blood glucoses were better controlled with sliding scale insulin.  Has no means to have insulin at home due to the above-mentioned reasons.  Was started on glipizide during this hospital stay.  Interval Problem Update  Resting comfortably in bed.  Stated that his vision on the left eye has much improved and the blindness is resolving gradually.  Denies any headache.  Blood pressure still suboptimally controlled.  Blood glucoses better controlled.  Tolerating p.o. fairly.  Denies any chest pain or shortness of breath.  No distress noted.  6/26: Resting comfortably in bed.  Stated that vision on the left eye has much improved.  Blood glucoses much improved with glipizide.  Blood pressure still poorly controlled.  Amlodipine was added.  Denies any chest pain or shortness of breath.  No acute distress  noted.  Blood [place] in the room down the gee.  6/27: Blood pressure well controlled as well as blood glucoses.  However, kidney functions deteriorated.  Hydrochlorthiazide discontinued.  Lisinopril dose decreased.  Started gentle IV hydration.  Echocardiogram showed hypertensive cardiomyopathy with moderate left ventricular hypertrophy with no evidence of heart failure.  Kidney ultrasound showed bilateral kidney cysts.  And left kidney nonobstructive stone.  No hydronephrosis.  Denies any chest pain or shortness of breath.  No acute distress noted.  Consultants/Specialty  Ophthalmology    Code Status  Full code    Disposition  Likely home once medically cleared    Review of Systems  Review of Systems   Constitutional: Negative for chills, fever, malaise/fatigue and weight loss.   HENT: Negative for hearing loss and tinnitus.    Eyes: Positive for blurred vision (Sudden onset of blurriness on left eye.  Resolving.). Negative for double vision, photophobia and pain.   Respiratory: Negative for cough and hemoptysis.    Cardiovascular: Negative for chest pain, palpitations, orthopnea, claudication and leg swelling.   Gastrointestinal: Negative for abdominal pain, diarrhea, heartburn, nausea and vomiting.   Genitourinary: Negative for dysuria, frequency and urgency.   Musculoskeletal: Negative for back pain, joint pain, myalgias and neck pain.   Skin: Negative for rash.   Neurological: Negative for dizziness, tingling, tremors, sensory change, speech change and headaches.   Endo/Heme/Allergies: Does not bruise/bleed easily.   Psychiatric/Behavioral: Negative for depression, hallucinations, substance abuse and suicidal ideas.        Physical Exam  Temp:  [36.5 °C (97.7 °F)-36.9 °C (98.4 °F)] 36.8 °C (98.2 °F)  Pulse:  [88-99] 88  Resp:  [16-18] 18  BP: (147-166)/(67-94) 156/93  SpO2:  [92 %-98 %] 98 %    Physical Exam   Constitutional: He is oriented to person, place, and time. No distress.   HENT:   Head: Normocephalic  and atraumatic.   Mouth/Throat: No oropharyngeal exudate.   Eyes: Pupils are equal, round, and reactive to light. Right eye exhibits no discharge. Left eye exhibits no discharge.   Neck: Normal range of motion. No tracheal deviation present. No thyromegaly present.   Cardiovascular: Normal rate and regular rhythm.  Exam reveals no gallop and no friction rub.    Pulmonary/Chest: Effort normal. No respiratory distress. He has no wheezes. He has no rales.   Abdominal: Soft. He exhibits no distension. There is no tenderness. There is no rebound and no guarding.   Musculoskeletal: He exhibits no tenderness or deformity.   Neurological: He is alert and oriented to person, place, and time. No cranial nerve deficit.   Skin: Skin is warm and dry. No rash noted. He is not diaphoretic. No erythema.   Psychiatric: He has a normal mood and affect. His behavior is normal. Thought content normal.       Fluids    Intake/Output Summary (Last 24 hours) at 06/27/19 1529  Last data filed at 06/27/19 0950   Gross per 24 hour   Intake              240 ml   Output                0 ml   Net              240 ml       Laboratory  Recent Labs      06/25/19   1046  06/26/19   0417   WBC  10.4  11.6*   RBC  4.87  4.88   HEMOGLOBIN  14.5  14.5   HEMATOCRIT  42.3  43.3   MCV  86.9  88.7   MCH  29.8  29.7   MCHC  34.3  33.5*   RDW  43.8  45.4   PLATELETCT  255  250   MPV  10.8  10.8     Recent Labs      06/25/19   1046  06/26/19   0417  06/27/19   0326   SODIUM  138  140  137   POTASSIUM  3.8  3.3*  3.6   CHLORIDE  104  106  104   CO2  23  23  24   GLUCOSE  253*  125*  101*   BUN  20  20  27*   CREATININE  1.80*  1.85*  2.25*   CALCIUM  8.5  8.6  8.4*                   Imaging  EC-ECHOCARDIOGRAM COMPLETE W/O CONT   Final Result      US-RENAL   Final Result      No hydronephrosis.   8.3 mm nonobstructing left renal stone.   5.2 cm mildly complicated cyst right ovary.   2.3 cm left renal cyst.      [id number] FILM X-RAY   Final Result        "    Assessment/Plan  * Acute loss of vision, left   Assessment & Plan    Patient reports previous history of \"retinal bleed\".  He states that he was seen by ophthalmology within the left and had some type of injection.  CT scan of the head is negative, and Dr. Garza of ophthalmology has been consulted.  We will begin to control his blood pressure which is accelerated and medically optimize his other medical comorbidities.  The patient will be admitted to the hospital for close monitoring and I look forward to Dr. Garza's recommendations.  6/25: His vision much improved.  Ophthalmology recommended outpatient follow-up.  6/26: Continues to improve.     JOY (acute kidney injury) ([facility])   Assessment & Plan    Acute kidney injury versus potential chronic kidney disease in the light of untreated diabetes mellitus.  Avoid nephrotoxins.  Renal dose all medications per  6/27: Kidney functions deteriorated.  Could be due to hydrochlorothiazide and lisinopril on board.  Will discontinue hydrochlorothiazide and decrease lisinopril dose.  We will hydrate with IV fluids.  Kidney ultrasound showed left simple cyst and right mildly complicated septated cyst.  Likely Bosniak category 2.  Will need outpatient imaging follow-up and monitoring.     Accelerated hypertension   Assessment & Plan    Secondary to medical noncompliance.  CT scan of the head is negative.  His blood pressure has improved since his arrival to our facility but still poorly controlled.  He did receive clonidine at the outside hospital however I believe this is a poor choice given its rebound potential in a patient with a history of medical noncompliance.  He was previously on lisinopril therefore I will start him on lisinopril and add hydrochlorothiazide.  We will begin to titrate upwards as needed to control his blood pressure.  We will plan for a slow gradual decline to avoid risk of ischemia.  Patient will also be on sodium restriction.  I will check an " echocardiogram to assess for development of underlying hypertensive heart disease.  6/25: Blood pressure still not well controlled.  Blood pressure medications doses adjusted.  Continue to monitor for now.  6/26: Blood pressure still not well controlled.  Amlodipine 5 mg p.o. daily added.  Continue to monitor.  6/27: Blood pressure is well controlled but kidney functions deteriorated.  Will decrease lisinopril dose and discontinue hydrochlorothiazide.  Will increase amlodipine dose to 10 mg daily.  Will monitor blood pressure closely.  Echocardiogram showed moderate left ventricular hypertrophy.  I also recommend outpatient sleep apnea work-up considering his obesity as well.     Kidney stone- (present on admission)   Assessment & Plan    Kidney ultrasound showed 8.3 mm nonobstructive left kidney stone.  Outpatient urology follow-up recommended.     Class 3 severe obesity due to excess calories with body mass index (BMI) of 40.0 to 44.9 in adult (HCC)- (present on admission)   Assessment & Plan    Counseling regarding lifestyle modification and diet was provided during this hospital stay.     Cardiomyopathy due to hypertension, without heart failure (HCC)- (present on admission)   Assessment & Plan    Due to poorly controlled hypertension.  Will need good blood pressure pressure control.  Clinically euvolemic.       Hypertriglyceridemia- (present on admission)   Assessment & Plan    Treatment as noted above.     Hyperlipidemia- (present on admission)   Assessment & Plan    Continue Lipitor.     Diabetes mellitus type 2, uncontrolled (HCC)- (present on admission)   Assessment & Plan    The patient's blood pressure is elevated in the 300 range, suspect he has been living at this level for quite some time.  He has no evidence of DKA.    6/25: Hemoglobin A1c 10.0 %.  Patient has been off his diabetes medications for 5 years due to financial difficulties.  He does not have insurance.  Will be poor candidate for insulin  at this point.  I started glipizide 5 mg p.o. twice daily.  Monitor blood glucoses for now.  Continue with sliding scale insulin.  6/26: Blood glucoses much improved with glipizide.  Continue to monitor for now.  6/27: Blood glucoses well controlled for now.  Continue current regimen.     Tobacco dependence- (present on admission)   Assessment & Plan    This patient continues to smoke cigarettes. 3 minutes were spent counseling the patient in cessation techniques. Patient understands smoking increases risk factors for stroke and death. Patient is open to counseling.  The benefits of stopping were presented and nicotine replacement has been ordered to assist with withdrawal from nicotine during hospitalization and we will continue to encourage cessation and discuss other supportive resources including community groups and prescription medications.     Plan of care discussed with multidisciplinary team during rounds.    VTE prophylaxis: SCDs

## 2019-06-28 ENCOUNTER — PATIENT OUTREACH (OUTPATIENT)
Dept: HEALTH INFORMATION MANAGEMENT | Facility: OTHER | Age: 44
End: 2019-06-28

## 2019-06-28 VITALS
HEART RATE: 91 BPM | DIASTOLIC BLOOD PRESSURE: 83 MMHG | SYSTOLIC BLOOD PRESSURE: 141 MMHG | WEIGHT: 274.69 LBS | RESPIRATION RATE: 17 BRPM | BODY MASS INDEX: 40.69 KG/M2 | HEIGHT: 69 IN | TEMPERATURE: 97.4 F | OXYGEN SATURATION: 98 %

## 2019-06-28 PROBLEM — H53.132 ACUTE LOSS OF VISION, LEFT: Status: RESOLVED | Noted: 2019-06-25 | Resolved: 2019-06-28

## 2019-06-28 PROBLEM — I10 ACCELERATED HYPERTENSION: Status: RESOLVED | Noted: 2019-06-25 | Resolved: 2019-06-28

## 2019-06-28 LAB
ALBUMIN SERPL BCP-MCNC: 3 G/DL (ref 3.2–4.9)
ALBUMIN/GLOB SERPL: 1.2 G/DL
ALP SERPL-CCNC: 50 U/L (ref 30–99)
ALT SERPL-CCNC: 11 U/L (ref 2–50)
ANION GAP SERPL CALC-SCNC: 9 MMOL/L (ref 0–11.9)
AST SERPL-CCNC: 20 U/L (ref 12–45)
BASOPHILS # BLD AUTO: 1 % (ref 0–1.8)
BASOPHILS # BLD: 0.09 K/UL (ref 0–0.12)
BILIRUB SERPL-MCNC: 0.5 MG/DL (ref 0.1–1.5)
BUN SERPL-MCNC: 26 MG/DL (ref 8–22)
CALCIUM SERPL-MCNC: 8.4 MG/DL (ref 8.5–10.5)
CHLORIDE SERPL-SCNC: 108 MMOL/L (ref 96–112)
CO2 SERPL-SCNC: 23 MMOL/L (ref 20–33)
CREAT SERPL-MCNC: 1.9 MG/DL (ref 0.5–1.4)
EOSINOPHIL # BLD AUTO: 0.46 K/UL (ref 0–0.51)
EOSINOPHIL NFR BLD: 5.3 % (ref 0–6.9)
ERYTHROCYTE [DISTWIDTH] IN BLOOD BY AUTOMATED COUNT: 45.1 FL (ref 35.9–50)
GLOBULIN SER CALC-MCNC: 2.6 G/DL (ref 1.9–3.5)
GLUCOSE BLD-MCNC: 127 MG/DL (ref 65–99)
GLUCOSE BLD-MCNC: 153 MG/DL (ref 65–99)
GLUCOSE SERPL-MCNC: 98 MG/DL (ref 65–99)
HCT VFR BLD AUTO: 43.1 % (ref 42–52)
HGB BLD-MCNC: 14.2 G/DL (ref 14–18)
IMM GRANULOCYTES # BLD AUTO: 0.03 K/UL (ref 0–0.11)
IMM GRANULOCYTES NFR BLD AUTO: 0.3 % (ref 0–0.9)
LYMPHOCYTES # BLD AUTO: 2.44 K/UL (ref 1–4.8)
LYMPHOCYTES NFR BLD: 27.9 % (ref 22–41)
MCH RBC QN AUTO: 29.5 PG (ref 27–33)
MCHC RBC AUTO-ENTMCNC: 32.9 G/DL (ref 33.7–35.3)
MCV RBC AUTO: 89.4 FL (ref 81.4–97.8)
MONOCYTES # BLD AUTO: 0.74 K/UL (ref 0–0.85)
MONOCYTES NFR BLD AUTO: 8.5 % (ref 0–13.4)
NEUTROPHILS # BLD AUTO: 4.97 K/UL (ref 1.82–7.42)
NEUTROPHILS NFR BLD: 57 % (ref 44–72)
NRBC # BLD AUTO: 0 K/UL
NRBC BLD-RTO: 0 /100 WBC
PLATELET # BLD AUTO: 247 K/UL (ref 164–446)
PMV BLD AUTO: 10.7 FL (ref 9–12.9)
POTASSIUM SERPL-SCNC: 3.7 MMOL/L (ref 3.6–5.5)
PROT SERPL-MCNC: 5.6 G/DL (ref 6–8.2)
RBC # BLD AUTO: 4.82 M/UL (ref 4.7–6.1)
SODIUM SERPL-SCNC: 140 MMOL/L (ref 135–145)
WBC # BLD AUTO: 8.7 K/UL (ref 4.8–10.8)

## 2019-06-28 PROCEDURE — 700105 HCHG RX REV CODE 258: Performed by: FAMILY MEDICINE

## 2019-06-28 PROCEDURE — 99239 HOSP IP/OBS DSCHRG MGMT >30: CPT | Performed by: FAMILY MEDICINE

## 2019-06-28 PROCEDURE — 80053 COMPREHEN METABOLIC PANEL: CPT

## 2019-06-28 PROCEDURE — A9270 NON-COVERED ITEM OR SERVICE: HCPCS | Performed by: FAMILY MEDICINE

## 2019-06-28 PROCEDURE — 82962 GLUCOSE BLOOD TEST: CPT | Mod: 91

## 2019-06-28 PROCEDURE — 85025 COMPLETE CBC W/AUTO DIFF WBC: CPT

## 2019-06-28 PROCEDURE — 36415 COLL VENOUS BLD VENIPUNCTURE: CPT

## 2019-06-28 PROCEDURE — 700102 HCHG RX REV CODE 250 W/ 637 OVERRIDE(OP): Performed by: FAMILY MEDICINE

## 2019-06-28 RX ORDER — ATORVASTATIN CALCIUM 40 MG/1
40 TABLET, FILM COATED ORAL EVERY EVENING
Qty: 30 TAB | Refills: 0 | Status: SHIPPED | OUTPATIENT
Start: 2019-06-28 | End: 2022-06-29 | Stop reason: SDUPTHER

## 2019-06-28 RX ORDER — LISINOPRIL 10 MG/1
10 TABLET ORAL DAILY
Qty: 30 TAB | Refills: 0 | Status: SHIPPED | OUTPATIENT
Start: 2019-06-29 | End: 2022-06-29

## 2019-06-28 RX ORDER — GLIPIZIDE 5 MG/1
5 TABLET ORAL
Qty: 60 TAB | Refills: 0 | Status: SHIPPED | OUTPATIENT
Start: 2019-06-28 | End: 2022-06-29

## 2019-06-28 RX ORDER — AMLODIPINE BESYLATE 10 MG/1
10 TABLET ORAL DAILY
Qty: 30 TAB | Refills: 0 | Status: SHIPPED | OUTPATIENT
Start: 2019-06-29 | End: 2022-06-29 | Stop reason: SDUPTHER

## 2019-06-28 RX ADMIN — LISINOPRIL 10 MG: 10 TABLET ORAL at 04:59

## 2019-06-28 RX ADMIN — GLIPIZIDE 5 MG: 10 TABLET ORAL at 09:07

## 2019-06-28 RX ADMIN — AMLODIPINE BESYLATE 10 MG: 10 TABLET ORAL at 04:59

## 2019-06-28 RX ADMIN — SODIUM CHLORIDE: 9 INJECTION, SOLUTION INTRAVENOUS at 02:30

## 2019-06-28 NOTE — CONSULTS
Diabetes education: Met with Real, who has a hx of diabetes , but has been off meds for more than five years as he is without insurance.  Briefly reviewed diabetes, glipizide, hypoglycemia, need to eat 3 meals and hs snack with carbohydrates and proteins. Reviewed [facility] DM book and number given for questions.  Discussed [facility] and [facility] and pharmacy, discussed Good [facility] , [facility] $4.00 drugs. Pt's prescriptions were sent to [facility], in [place]. CDE called and got prices : Glipizide was $8.31, Atorvastatin was $10.38, Lisinopril was $9.45 and Amlodipine was $10.32.  Prices and discussion on cost given to patient. Handout on clinics and [facility] resources given as well.  Please call [phone] if needs change.

## 2019-06-28 NOTE — PROGRESS NOTES
"Assumed care of pt at shift change, report received from day shift RN  Pt A & O x 4, no complaints of pain at this time  BP (!) 161/79   Pulse 92   Temp 36.6 °C (97.8 °F) (Temporal)   Resp 16   Ht 1.753 m (5' 9\")   Wt 124.6 kg (274 lb 11.1 oz)   SpO2 91%   BMI 40.57 kg/m²      20G LAC PIV in place, infusing  Pt has ACHS BSFS, has not needed insulin since 6/25  Last BM was today 6/27  Voids in the toilet, urine clear yellow  Pt has been ambulating without assistance, tolerates well, steady  Call light is within reach, pt calls for assistance    "

## 2019-06-28 NOTE — DISCHARGE INSTRUCTIONS
Discharge Instructions    Discharged to home by car with relative. Discharged via walking, hospital escort: Yes.  Special equipment needed: Not Applicable    Be sure to schedule a follow-up appointment with your primary care doctor or any specialists as instructed.     Discharge Plan:   Diet Plan: Discussed  Activity Level: Discussed  Confirmed Follow up Appointment: Patient to Call and Schedule Appointment  Confirmed Symptoms Management: Discussed  Medication Reconciliation Updated: Yes  Pneumococcal Vaccine Administered/Refused: Not given - Patient refused pneumococcal vaccine  Influenza Vaccine Indication: Patient Refuses    I understand that a diet low in cholesterol, fat, and sodium is recommended for good health. Unless I have been given specific instructions below for another diet, I accept this instruction as my diet prescription.   Other diet: Diabetic diet    Special Instructions:   Monitor for signs and symptoms of infection (fever, chills, nausea, vomiting)    Diabetes Mellitus and Food  It is important for you to manage your blood sugar (glucose) level. Your blood glucose level can be greatly affected by what you eat. Eating healthier foods in the appropriate amounts throughout the day at about the same time each day will help you control your blood glucose level. It can also help slow or prevent worsening of your diabetes mellitus. Healthy eating may even help you improve the level of your blood pressure and reach or maintain a healthy weight.  General recommendations for healthful eating and cooking habits include:  · Eating meals and snacks regularly. Avoid going long periods of time without eating to lose weight.  · Eating a diet that consists mainly of plant-based foods, such as fruits, vegetables, nuts, legumes, and whole grains.  · Using low-heat cooking methods, such as baking, instead of high-heat cooking methods, such as deep frying.  Work with your dietitian to make sure you understand how to  use the Nutrition Facts information on food labels.  How can food affect me?  Carbohydrates   Carbohydrates affect your blood glucose level more than any other type of food. Your dietitian will help you determine how many carbohydrates to eat at each meal and teach you how to count carbohydrates. Counting carbohydrates is important to keep your blood glucose at a healthy level, especially if you are using insulin or taking certain medicines for diabetes mellitus.  Alcohol   Alcohol can cause sudden decreases in blood glucose (hypoglycemia), especially if you use insulin or take certain medicines for diabetes mellitus. Hypoglycemia can be a life-threatening condition. Symptoms of hypoglycemia (sleepiness, dizziness, and disorientation) are similar to symptoms of having too much alcohol.  If your health care provider has given you approval to drink alcohol, do so in moderation and use the following guidelines:  · Women should not have more than one drink per day, and men should not have more than two drinks per day. One drink is equal to:  ¨ 12 oz of beer.  ¨ 5 oz of wine.  ¨ 1½ oz of hard liquor.  · Do not drink on an empty stomach.  · Keep yourself hydrated. Have water, diet soda, or unsweetened iced tea.  · Regular soda, juice, and other mixers might contain a lot of carbohydrates and should be counted.  What foods are not recommended?  As you make food choices, it is important to remember that all foods are not the same. Some foods have fewer nutrients per serving than other foods, even though they might have the same number of calories or carbohydrates. It is difficult to get your body what it needs when you eat foods with fewer nutrients. Examples of foods that you should avoid that are high in calories and carbohydrates but low in nutrients include:  · Trans fats (most processed foods list trans fats on the Nutrition Facts label).  · Regular soda.  · Juice.  · Candy.  · Sweets, such as cake, pie, doughnuts, and  cookies.  · Fried foods.  What foods can I eat?  Eat nutrient-rich foods, which will nourish your body and keep you healthy. The food you should eat also will depend on several factors, including:  · The calories you need.  · The medicines you take.  · Your weight.  · Your blood glucose level.  · Your blood pressure level.  · Your cholesterol level.  You should eat a variety of foods, including:  · Protein.  ¨ Lean cuts of meat.  ¨ Proteins low in saturated fats, such as fish, egg whites, and beans. Avoid processed meats.  · Fruits and vegetables.  ¨ Fruits and vegetables that may help control blood glucose levels, such as apples, mangoes, and yams.  · Dairy products.  ¨ Choose fat-free or low-fat dairy products, such as milk, yogurt, and cheese.  · Grains, bread, pasta, and rice.  ¨ Choose whole grain products, such as multigrain bread, whole oats, and brown rice. These foods may help control blood pressure.  · Fats.  ¨ Foods containing healthful fats, such as nuts, avocado, olive oil, canola oil, and fish.  Does everyone with diabetes mellitus have the same meal plan?  Because every person with diabetes mellitus is different, there is not one meal plan that works for everyone. It is very important that you meet with a dietitian who will help you create a meal plan that is just right for you.  This information is not intended to replace advice given to you by your health care provider. Make sure you discuss any questions you have with your health care provider.  Document Released: 09/14/2006 Document Revised: 05/25/2017 Document Reviewed: 11/14/2014  [employer] Interactive Patient Education © 2017 [employer]    Diabetes, Frequently Asked Questions  WHAT IS DIABETES?  Most of the food we eat is turned into glucose (sugar). Our bodies use it for energy. The pancreas makes a hormone called insulin. It helps glucose get into the cells of our bodies. When you have diabetes, your body either does not make enough insulin  or cannot use its own insulin as well as it should. This causes sugars to build up in your blood.  WHAT ARE THE SYMPTOMS OF DIABETES?  · Frequent urination.   · Excessive thirst.   · Unexplained weight loss.   · Extreme hunger.   · Blurred vision.   · Tingling or numbness in hands or feet.   · Feeling very tired much of the time.   · Dry, itchy skin.   · Sores that are slow to heal.   · Yeast infections.   WHAT ARE THE TYPES OF DIABETES?  Type 1 Diabetes   · About 10% of affected people have this type.   · Usually occurs before the age of 30.   · Usually occurs in thin to normal weight people.   Type 2 Diabetes  · About 90% of affected people have this type.   · Usually occurs after the age of 40.   · Usually occurs in overweight people.   · More likely to have:   · A family history of diabetes.   · A history of diabetes during pregnancy (gestational diabetes).   · High blood pressure.   · High cholesterol and triglycerides.   Gestational Diabetes  · Occurs in about 4% of pregnancies.   · Usually goes away after the baby is born.   · More likely to occur in women with:   · Family history of diabetes.   · Previous gestational diabetes.   · Obese.   · Over 25 years old.   WHAT IS PRE-DIABETES?  Pre-diabetes means your blood glucose is higher than normal, but lower than the diabetes range. It also means you are at risk of getting type 2 diabetes and heart disease. If you are told you have pre-diabetes, have your blood glucose checked again in 1 to 2 years.  WHAT IS THE TREATMENT FOR DIABETES?  Treatment is aimed at keeping blood glucose near normal levels at all times. Learning how to manage this yourself is important in treating diabetes. Depending on the type of diabetes you have, your treatment will include one or more of the following:  · Monitoring your blood glucose.   · Meal planning.   · Exercise.   · Oral medicine (pills) or insulin.   CAN DIABETES BE PREVENTED?  With type 1 diabetes, prevention is more  difficult, because the triggers that cause it are not yet known.  With type 2 diabetes, prevention is more likely, with lifestyle changes:  · Maintain a healthy weight.   · Eat healthy.   · Exercise.   IS THERE A CURE FOR DIABETES?  No, there is no cure for diabetes. There is a lot of research going on that is looking for a cure, and progress is being made. Diabetes can be treated and controlled. People with diabetes can manage their diabetes and lead normal, active lives.  SHOULD I BE TESTED FOR DIABETES?  If you are at least 45 years old, you should be tested for diabetes. You should be tested again every 3 years. If you are 45 or older and overweight, you may want to get tested more often. If you are younger than 45, overweight, and have one or more of the following risk factors, you should be tested:  · Family history of diabetes.   · Inactive lifestyle.   · High blood pressure.   WHAT ARE SOME OTHER SOURCES FOR INFORMATION ON DIABETES?  The following organizations may help in your search for more information on diabetes:  National Diabetes Education Program (NDEP)  Internet: http://www.ndep.nih.gov/resources  American Diabetes Association  Internet: http://www.diabetes.org   Juvenile Diabetes Foundation International  Internet: http://www.jdf.org  Document Released: 12/20/2004 Document Revised: 03/11/2013 Document Reviewed: 10/15/2010  ExitCare® Patient Information ©2013 [employer].    Diabetes Mellitus and Exercise  Exercising regularly is important for your overall health, especially when you have diabetes (diabetes mellitus). Exercising is not only about losing weight. It has many health benefits, such as increasing muscle strength and bone density and reducing body fat and stress. This leads to improved fitness, flexibility, and endurance, all of which result in better overall health.  Exercise has additional benefits for people with diabetes, including:  · Reducing appetite.  · Helping to lower and  control blood glucose.  · Lowering blood pressure.  · Helping to control amounts of fatty substances (lipids) in the blood, such as cholesterol and triglycerides.  · Helping the body to respond better to insulin (improving insulin sensitivity).  · Reducing how much insulin the body needs.  · Decreasing the risk for heart disease by:  ¨ Lowering cholesterol and triglyceride levels.  ¨ Increasing the levels of good cholesterol.  ¨ Lowering blood glucose levels.  What is my activity plan?  Your health care provider or certified diabetes educator can help you make a plan for the type and frequency of exercise (activity plan) that works for you. Make sure that you:  · Do at least 150 minutes of moderate-intensity or vigorous-intensity exercise each week. This could be brisk walking, biking, or water aerobics.  ¨ Do stretching and strength exercises, such as yoga or weightlifting, at least 2 times a week.  ¨ Spread out your activity over at least 3 days of the week.  · Get some form of physical activity every day.  ¨ Do not go more than 2 days in a row without some kind of physical activity.  ¨ Avoid being inactive for more than 90 minutes at a time. Take frequent breaks to walk or stretch.  · Choose a type of exercise or activity that you enjoy, and set realistic goals.  · Start slowly, and gradually increase the intensity of your exercise over time.  What do I need to know about managing my diabetes?  · Check your blood glucose before and after exercising.  ¨ If your blood glucose is higher than 240 mg/dL (13.3 mmol/L) before you exercise, check your urine for ketones. If you have ketones in your urine, do not exercise until your blood glucose returns to normal.  · Know the symptoms of low blood glucose (hypoglycemia) and how to treat it. Your risk for hypoglycemia increases during and after exercise. Common symptoms of hypoglycemia can include:  ¨ Hunger.  ¨ Anxiety.  ¨ Sweating and feeling  clammy.  ¨ Confusion.  ¨ Dizziness or feeling light-headed.  ¨ Increased heart rate or palpitations.  ¨ Blurry vision.  ¨ Tingling or numbness around the mouth, lips, or tongue.  ¨ Tremors or shakes.  ¨ Irritability.  · Keep a rapid-acting carbohydrate snack available before, during, and after exercise to help prevent or treat hypoglycemia.  · Avoid injecting insulin into areas of the body that are going to be exercised. For example, avoid injecting insulin into:  ¨ The arms, when playing tennis.  ¨ The legs, when jogging.  · Keep records of your exercise habits. Doing this can help you and your health care provider adjust your diabetes management plan as needed. Write down:  ¨ Food that you eat before and after you exercise.  ¨ Blood glucose levels before and after you exercise.  ¨ The type and amount of exercise you have done.  ¨ When your insulin is expected to peak, if you use insulin. Avoid exercising at times when your insulin is peaking.  · When you start a new exercise or activity, work with your health care provider to make sure the activity is safe for you, and to adjust your insulin, medicines, or food intake as needed.  · Drink plenty of water while you exercise to prevent dehydration or heat stroke. Drink enough fluid to keep your urine clear or pale yellow.  This information is not intended to replace advice given to you by your health care provider. Make sure you discuss any questions you have with your health care provider.  Document Released: 03/09/2005 Document Revised: 07/07/2017 Document Reviewed: 05/29/2017  [employer] Interactive Patient Education © 2017 [employer]        Diabetes, Keeping Your Heart and Blood Vessels Healthy  Too much glucose (sugar) in the blood for a long period of time can cause problems for those who have diabetes. This high blood glucose can damage many parts of the body, such as the heart, blood vessels, eyes, and kidneys. Heart and blood vessel disease can lead to  heart attacks and strokes, which is the leading cause of death for people with diabetes. There are many things you can to do slow down or prevent the complications from diabetes.  WHAT DO MY HEART AND BLOOD VESSELS DO?  Your heart and blood vessels make up your circulatory system. Your heart is a big muscle that pumps blood through your body. Your heart pumps blood carrying oxygen to large blood vessels (arteries) and small blood vessels (capillaries). Other blood vessels, called veins, carry blood back to the heart.  HOW DO MY BLOOD VESSELS GET CLOGGED?  Several things, including having diabetes, can make your blood cholesterol level too high. Cholesterol is a substance that is made by the body and used for many important functions. It is also found in some food that comes from animals. When cholesterol is too high, the insides of large blood vessels become narrowed, even clogged. Narrowed and clogged blood vessels make it harder for enough blood to get to all parts of your body. This problem is called atherosclerosis.  WHAT CAN HAPPEN WHEN BLOOD VESSELS ARE CLOGGED?  When arteries become narrowed and clogged, you may have heart problems and are at increased risk for heart attack and stroke:  · Chest pain (angina) causes pain in your chest, arms, shoulders, or back. You may feel the pain more when your heart beats faster, such as when you exercise. The pain may go away when you rest. You also may feel very weak and sweaty. If you do not get treatment, chest pain may happen more often. If diabetes has damaged the heart nerves, you may not feel the chest pain.   · Heart attack. A heart attack happens when a blood vessel in or near the heart becomes blocked. Not enough blood can get to that part of the heart muscle so the area becomes oxygen deprived and the heart muscle may be permanently damaged.   WHAT ARE THE WARNING SIGNS OF A HEART ATTACK?  You may have one or more of the following warning signs:  · Chest pain  "or discomfort.   · Pain or discomfort in your arms, back, jaw, or neck.   · Indigestion or stomach pain.   · Shortness of breath.   · Sweating.   · Nausea or vomiting.   · Light-headedness.   · You may have no warning signs at all, or they may come and go.   HOW DOES HEART DISEASE CAUSE HIGH BLOOD PRESSURE?  Narrowed blood vessels leave a smaller opening for blood to flow through. It is like turning on a garden hose and holding your thumb over the opening. The smaller opening makes the water shoot out with more pressure. In the same way, narrowed blood vessels lead to high blood pressure. Other factors, such as kidney problems and being overweight, can also lead to high blood pressure.  Many people with diabetes also have high blood pressure. If you have heart, eye, or kidney problems from diabetes, high blood pressure can make them worse.  If you have high blood pressure, ask your caregiver how to lower it. Your caregiver may be asked to take blood pressure medicine every day. Some types of blood pressure medicine can also help keep your kidneys healthy.  To lower your blood pressure, your may be asked to lose weight; eat more fruits and vegetables; eat less salt and high-sodium foods, such as canned soups, luncheon meats, salty snack foods, and fast foods; and drink less alcohol.  WHAT ARE THE WARNING SIGNS OF A STROKE?  A stroke happens when part of your brain is not getting enough blood and stops working. Depending on the part of the brain that is damaged, a stroke can cause:  · Sudden weakness or numbness of your face, arm, or leg on one side of your body.   · Sudden confusion, trouble talking, or trouble understanding.   · Sudden dizziness, loss of balance, or trouble walking.   · Sudden trouble seeing in one or both eyes or sudden double vision.   · Sudden severe headache.   Sometimes, one or more of these warning signs may happen and then disappear. You might be having a \"mini-stroke,\" also called a TIA " "(transient ischemic attack). If you have any of these warning signs, tell your caregiver right away.  HOW CAN CLOGGED BLOOD VESSELS HURT MY LEGS AND FEET?  Peripheral vascular disease can happen when the openings in your blood vessels become narrow and not enough blood gets to your legs and feet. You may feel pain in your buttocks, the back of your legs, or your thighs when you stand, walk, or exercise. Sometimes, surgery is necessary to treat this problem.  WHAT CAN I DO TO KEEP MY BLOOD VESSELS HEALTHY AND PREVENT HEART DISEASE AND STROKE?  · Keep your blood glucose under control. An A1c blood test will probably be ordered by your caregiver at least twice a year. The A1c test tells you your average blood glucose for the past 2 to 3 months and can give valuable information on the overall control of your diabetes.   · Keep your blood pressure under control. Have it checked at every health care visit. If you are on medication to control blood pressure, take it exactly as prescribed. The target for most people is below 130/80.   · Keep your cholesterol under control. Have it checked at least once a year. The targets for most people are:   · LDL (bad) cholesterol: below 100 mg/dl.   · HDL (good) cholesterol: above 40 mg/dl in men and above 50 mg/dl in women.   · Triglycerides (another type of fat in the blood): below 150 mg/dl.   · Make physical activity a part of your daily routine. Aim for at least 30 minutes of exercise most days of the week. Check with your caregiver to learn what activities are best for you.   · Make sure that the foods you eat are \"heart-healthy.\" Include foods high in fiber, such as oat bran, oatmeal, whole-grain breads and cereals, fruits, and vegetables. Cut back on foods high in saturated fat or cholesterol, such as meats, butter, dairy products with fat, eggs, shortening, lard, and foods with palm oil or coconut oil.   · Maintain a healthy weight. If you are overweight, try to exercise most " "days of the week. See a registered dietitian for help in planning meals and lowering the fat and calorie content.   · If you smoke, QUIT. Your caregiver can tell you about ways to help you quit smoking.   · Ask your caregiver whether you should take an aspirin every day. Studies have shown that taking a low dose of aspirin every day can help reduce your risk of heart disease and stroke.   · Take your medicines as directed.   SEEK MEDICAL CARE IF:   · You have any of the warning signs of a heart attack or stroke.   · If you have pain in your legs or feet when walking.   · If your feet and legs are cool or cold to the touch.   FOR MORE INFORMATION   · Diabetes educators (nurses, dietitians, pharmacists, and other health professionals).   · To find a diabetes educator near you, call the American Association of Diabetes Educators (AADE) toll-free at 2-221-TGRPGD1 (1-518.819.4188), or look on the Internet at www.diabeteseducator.org and click on \"Find a Diabetes Educator.\"   · Dietitians.   · To find a dietitian near you, call the American Dietetic Association toll-free at 1-449.593.1626, or look on the Internet at www.[employer].org and click on \"Find a Nutrition Professional.\"   · Government.   · The National Heart, Lung, and Blood [place] (NHLBI) is part of the National Institutes of Health. To learn more about heart and blood vessel problems, write or call NHLBI Information Center, [address] [address], [place] [address], (946) 185-2005; or see www.nhlbi.nih.gov on the Internet.   · To get more information about taking care of diabetes, contact:   National Diabetes Information Clearinghouse  [address] [place] [address]  Phone: 1-597.208.4680 or (047) [phone]  Fax: [phone]  Email: carlota@info.niddk.nih.gov  Internet: www.diabetes.niddk.nih.gov  National Diabetes Education Program  [address] [place] [address]  Phone: 1-569.615.8732  Fax: [phone]  Internet: " http://ndep.nih.gov  American Diabetes Association  [address] [place] [address]  Phone: 1-872.501.4777  Internet: www.diabetes.org  Juvenile Diabetes Research Foundation Int'l  [address] [address]  Phone: 1-799.931.2242  Internet: www.jdrf.org  Document Released: 12/20/2004 Document Revised: 03/11/2013 Document Reviewed: 05/28/2010  ExitCare® Patient Information ©2013 [employer] [facility].      Depression / Suicide Risk    As you are discharged from this [facility] facility, it is important to learn how to keep safe from harming yourself.    Recognize the warning signs:  · Abrupt changes in personality, positive or negative- including increase in energy   · Giving away possessions  · Change in eating patterns- significant weight changes-  positive or negative  · Change in sleeping patterns- unable to sleep or sleeping all the time   · Unwillingness or inability to communicate  · Depression  · Unusual sadness, discouragement and loneliness  · Talk of wanting to die  · Neglect of personal appearance   · Rebelliousness- reckless behavior  · Withdrawal from people/activities they love  · Confusion- inability to concentrate     If you or a loved one observes any of these behaviors or has concerns about self-harm, here's what you can do:  · Talk about it- your feelings and reasons for harming yourself  · Remove any means that you might use to hurt yourself (examples: pills, rope, extension cords, firearm)  · Get professional help from the community (Mental Health, Substance Abuse, psychological counseling)  · Do not be alone:Call your Safe Contact- someone whom you trust who will be there for you.  · Call your local CRISIS HOTLINE 591-8949 or 955-976-4492  · Call your local Children's Mobile Crisis Response Team [place] (615) 166-8877 or www.[employer]  · Call the toll free National Suicide Prevention Hotlines   · National Suicide Prevention Lifeline 945-161-SFFL  (7926)  · [facility] 800-SUICIDE (429-5526)

## 2019-06-28 NOTE — PROGRESS NOTES
Diabetes education: Met with pt this afternoon. Discussed finger sticks ( he has done before), frequency and goals. Discussed Accucheck with [facility] vs [facility] reli on brand. Pt will follow up with [facility]. Education completed. Please see consult note. Please call [phone] if needs change.

## 2019-06-28 NOTE — PROGRESS NOTES
Bedside report received. Assessment completed.  Pt is A&O x4. Pt on room air.   Denies pain and nausea.  Last BM 6/27   +void.  Tolerating Diabetic diet  Pt up self. Tolerates well.   Call light within reach. All needs met at this time. Fall Precautions and hourly rounding in place.

## 2019-06-28 NOTE — PROGRESS NOTES
Discharging Patient home per physician order.  Discharged with Family.  Demonstrated understanding of discharge instructions, follow up appointments, home medications, prescriptions, and instructions for how to obtain discounted medications.  Diabetic education consult completed prior to discharge.  Ambulating without assistance, voiding without difficulty, pain well controlled, tolerating oral medications, oxygen saturation greater than 90%, tolerating diet.  Educational handouts given and discussed.  Verbalized understanding of discharge instructions and educational handouts.  All questions answered.  Belongings with patient at time of discharge.

## 2019-06-29 NOTE — DISCHARGE SUMMARY
Discharge Summary    CHIEF COMPLAINT ON ADMISSION  Chief Complaint   Patient presents with   • Hypertension       Reason for Admission  EMS     Admission Date  6/24/2019    CODE STATUS  Prior    HPI & HOSPITAL COURSE  This is a 44 years old male with past medical history of essential hypertension, and type 2 diabetes mellitus not compliant with treatment due to financial difficulties and lack of insurance.  Also history of diabetic retinopathy and left eye retinal hemorrhage in the past comes in with acute onset of blurry vision on the left eye.  Patient stated that he has been off his medications for hypertension and diabetes for almost 5 years due to financial difficulties.  He presented to outside facility where his labs were pertinent for creatinine of 2.12 and blood glucoses of 318 mg/dL.  Was transferred here for higher level of care.  Ophthalmology consulted.  Recommended outpatient follow-up as his vision improved over hospital course.  He was hypertensive upon admission and started on oral blood pressure medications.  Blood pressure was continued to be suboptimally controlled and his medication doses adjusted.  Hemoglobin A1c was 10.0%.  Blood glucoses were better controlled with sliding scale insulin.  Has no means to have insulin at home due to the above-mentioned reasons.  Was started on glipizide during this hospital stay.  Blood glucoses were well controlled.  Initially started on hydrochlorothiazide and high-dose lisinopril.  Noted that his kidney functions gradually worsened.  Hydrochlorthiazide was discontinued and lisinopril dose was decreased.  Was given fluids and his kidney functions improved.  Echocardiogram showed ejection fraction of 55% with moderate left ventricular hypertrophy which likely represent hypertensive cardiomyopathy without heart failure.  Ultrasound of the kidneys showed incidental left simple cyst and right mildly complicated septated cyst.  Also there was 8.3 mm  nonobstructive left kidney stone.  Patient informed of findings.  Recommended outpatient follow-up.  Blood pressure was well controlled.  Patient denies any chest pain or shortness of breath.  Kidney function is improved and patient was advised to hydrate herself well and avoid kidney toxic medications.  Patient verbalized understanding.  He was given the information of [facility] to call and make an appointment for follow-up.  The blurriness on his left eye is 99% resolved as he stated.  He was hemodynamically and clinically stable.  He was cleared for discharge from medical standpoint.       Therefore, he is discharged in fair and stable condition to home with close outpatient follow-up.    The patient met 2-midnight criteria for an inpatient stay at the time of discharge.    Discharge Date  6/28/2019    FOLLOW UP ITEMS POST DISCHARGE  Follow-up with PCP in 1 week  The referral from his PCP to urologist for 8.3 mm left nonobstructive kidney stone.  DISCHARGE DIAGNOSES  Principal Problem (Resolved):    Acute loss of vision, left POA: Unknown  Active Problems:    JOY (acute kidney injury) ([facility]) POA: Unknown    Tobacco dependence POA: Yes    Diabetes mellitus type 2, uncontrolled ([facility]) POA: Yes    Hyperlipidemia POA: Yes    Hypertriglyceridemia POA: Yes    Cardiomyopathy due to hypertension, without heart failure ([facility]) POA: Yes    Class 3 severe obesity due to excess calories with body mass index (BMI) of 40.0 to 44.9 in adult ([facility]) POA: Yes    Kidney stone POA: Yes  Resolved Problems:    Accelerated hypertension POA: Unknown      FOLLOW UP  No future appointments.  [address] [address] [phone]    PLEASE CALL OR WALK IN TO ESTABLISH A NEW PCP APPOINTMENT. THANK YOU      MEDICATIONS ON DISCHARGE     Medication List      START taking these medications      Instructions   amLODIPine 10 MG Tabs  Start taking on:  6/29/2019  Commonly known as:   NORVASC   Take 1 Tab by mouth every day.  Dose:  10 mg     atorvastatin 40 MG Tabs  Commonly known as:  LIPITOR   Take 1 Tab by mouth every evening.  Dose:  40 mg     glipiZIDE 5 MG Tabs  Commonly known as:  GLUCOTROL   Take 1 Tab by mouth 2 times daily, before breakfast and dinner.  Dose:  5 mg     lisinopril 10 MG Tabs  Start taking on:  6/29/2019  Commonly known as:  PRINIVIL   Take 1 Tab by mouth every day.  Dose:  10 mg        CONTINUE taking these medications      Instructions   therapeutic multivitamin-minerals Tabs   Take 1 Tab by mouth every day.  Dose:  1 Tab            Allergies  No Known Allergies    DIET  No orders of the defined types were placed in this encounter.      ACTIVITY  As tolerated.  Weight bearing as tolerated    CONSULTATIONS  None    PROCEDURES  None    LABORATORY  Lab Results   Component Value Date    SODIUM 140 06/28/2019    POTASSIUM 3.7 06/28/2019    CHLORIDE 108 06/28/2019    CO2 23 06/28/2019    GLUCOSE 98 06/28/2019    BUN 26 (H) 06/28/2019    CREATININE 1.90 (H) 06/28/2019        Lab Results   Component Value Date    WBC 8.7 06/28/2019    HEMOGLOBIN 14.2 06/28/2019    HEMATOCRIT 43.1 06/28/2019    PLATELETCT 247 06/28/2019        Total time of the discharge process exceeds 40 minutes.

## 2021-11-16 ENCOUNTER — HOSPITAL ENCOUNTER (OUTPATIENT)
Facility: MEDICAL CENTER | Age: 46
End: 2021-11-16
Attending: OPHTHALMOLOGY | Admitting: OPHTHALMOLOGY
Payer: COMMERCIAL

## 2022-01-21 ENCOUNTER — PRE-ADMISSION TESTING (OUTPATIENT)
Dept: ADMISSIONS | Facility: MEDICAL CENTER | Age: 47
End: 2022-01-21
Attending: OPHTHALMOLOGY
Payer: COMMERCIAL

## 2022-01-21 DIAGNOSIS — Z01.810 PRE-OPERATIVE CARDIOVASCULAR EXAMINATION: ICD-10-CM

## 2022-01-21 DIAGNOSIS — Z01.812 PRE-OPERATIVE LABORATORY EXAMINATION: ICD-10-CM

## 2022-01-21 LAB
ANION GAP SERPL CALC-SCNC: 10 MMOL/L (ref 7–16)
BUN SERPL-MCNC: 30 MG/DL (ref 8–22)
CALCIUM SERPL-MCNC: 8.8 MG/DL (ref 8.5–10.5)
CHLORIDE SERPL-SCNC: 107 MMOL/L (ref 96–112)
CO2 SERPL-SCNC: 23 MMOL/L (ref 20–33)
CREAT SERPL-MCNC: 3.11 MG/DL (ref 0.5–1.4)
EKG IMPRESSION: NORMAL
ERYTHROCYTE [DISTWIDTH] IN BLOOD BY AUTOMATED COUNT: 45.3 FL (ref 35.9–50)
GLUCOSE SERPL-MCNC: 67 MG/DL (ref 65–99)
HCT VFR BLD AUTO: 37.2 % (ref 42–52)
HGB BLD-MCNC: 12.1 G/DL (ref 14–18)
MCH RBC QN AUTO: 29.8 PG (ref 27–33)
MCHC RBC AUTO-ENTMCNC: 32.5 G/DL (ref 33.7–35.3)
MCV RBC AUTO: 91.6 FL (ref 81.4–97.8)
PLATELET # BLD AUTO: 285 K/UL (ref 164–446)
PMV BLD AUTO: 10.2 FL (ref 9–12.9)
POTASSIUM SERPL-SCNC: 3.8 MMOL/L (ref 3.6–5.5)
RBC # BLD AUTO: 4.06 M/UL (ref 4.7–6.1)
SODIUM SERPL-SCNC: 140 MMOL/L (ref 135–145)
WBC # BLD AUTO: 7.3 K/UL (ref 4.8–10.8)

## 2022-01-21 PROCEDURE — 85027 COMPLETE CBC AUTOMATED: CPT

## 2022-01-21 PROCEDURE — U0005 INFEC AGEN DETEC AMPLI PROBE: HCPCS

## 2022-01-21 PROCEDURE — 80048 BASIC METABOLIC PNL TOTAL CA: CPT

## 2022-01-21 PROCEDURE — 36415 COLL VENOUS BLD VENIPUNCTURE: CPT

## 2022-01-21 PROCEDURE — 93005 ELECTROCARDIOGRAM TRACING: CPT

## 2022-01-21 PROCEDURE — 93010 ELECTROCARDIOGRAM REPORT: CPT | Performed by: INTERNAL MEDICINE

## 2022-01-21 PROCEDURE — C9803 HOPD COVID-19 SPEC COLLECT: HCPCS

## 2022-01-21 PROCEDURE — U0003 INFECTIOUS AGENT DETECTION BY NUCLEIC ACID (DNA OR RNA); SEVERE ACUTE RESPIRATORY SYNDROME CORONAVIRUS 2 (SARS-COV-2) (CORONAVIRUS DISEASE [COVID-19]), AMPLIFIED PROBE TECHNIQUE, MAKING USE OF HIGH THROUGHPUT TECHNOLOGIES AS DESCRIBED BY CMS-2020-01-R: HCPCS

## 2022-01-22 LAB
SARS-COV-2 RNA RESP QL NAA+PROBE: NOTDETECTED
SPECIMEN SOURCE: NORMAL

## 2022-01-25 ENCOUNTER — HOSPITAL ENCOUNTER (OUTPATIENT)
Facility: MEDICAL CENTER | Age: 47
End: 2022-01-25
Attending: OPHTHALMOLOGY | Admitting: OPHTHALMOLOGY
Payer: COMMERCIAL

## 2022-01-25 ENCOUNTER — ANESTHESIA (OUTPATIENT)
Dept: SURGERY | Facility: MEDICAL CENTER | Age: 47
End: 2022-01-25
Payer: COMMERCIAL

## 2022-01-25 ENCOUNTER — ANESTHESIA EVENT (OUTPATIENT)
Dept: SURGERY | Facility: MEDICAL CENTER | Age: 47
End: 2022-01-25
Payer: COMMERCIAL

## 2022-01-25 VITALS
TEMPERATURE: 97 F | DIASTOLIC BLOOD PRESSURE: 78 MMHG | BODY MASS INDEX: 39.55 KG/M2 | SYSTOLIC BLOOD PRESSURE: 141 MMHG | WEIGHT: 251.99 LBS | RESPIRATION RATE: 18 BRPM | HEIGHT: 67 IN | OXYGEN SATURATION: 93 % | HEART RATE: 64 BPM

## 2022-01-25 LAB
GLUCOSE BLD-MCNC: 82 MG/DL (ref 65–99)
GLUCOSE BLD-MCNC: 84 MG/DL (ref 65–99)

## 2022-01-25 PROCEDURE — 82962 GLUCOSE BLOOD TEST: CPT | Mod: 91

## 2022-01-25 PROCEDURE — 700101 HCHG RX REV CODE 250: Performed by: ANESTHESIOLOGY

## 2022-01-25 PROCEDURE — 501836 HCHG SUTURE EYE: Performed by: OPHTHALMOLOGY

## 2022-01-25 PROCEDURE — 700105 HCHG RX REV CODE 258: Performed by: OPHTHALMOLOGY

## 2022-01-25 PROCEDURE — 160048 HCHG OR STATISTICAL LEVEL 1-5: Performed by: OPHTHALMOLOGY

## 2022-01-25 PROCEDURE — A9270 NON-COVERED ITEM OR SERVICE: HCPCS | Performed by: ANESTHESIOLOGY

## 2022-01-25 PROCEDURE — 501838 HCHG SUTURE GENERAL: Performed by: OPHTHALMOLOGY

## 2022-01-25 PROCEDURE — 160002 HCHG RECOVERY MINUTES (STAT): Performed by: OPHTHALMOLOGY

## 2022-01-25 PROCEDURE — 700101 HCHG RX REV CODE 250

## 2022-01-25 PROCEDURE — 700111 HCHG RX REV CODE 636 W/ 250 OVERRIDE (IP): Performed by: OPHTHALMOLOGY

## 2022-01-25 PROCEDURE — 700111 HCHG RX REV CODE 636 W/ 250 OVERRIDE (IP): Performed by: ANESTHESIOLOGY

## 2022-01-25 PROCEDURE — 160047 HCHG PACU  - EA ADDL 30 MINS PHASE II: Performed by: OPHTHALMOLOGY

## 2022-01-25 PROCEDURE — 160036 HCHG PACU - EA ADDL 30 MINS PHASE I: Performed by: OPHTHALMOLOGY

## 2022-01-25 PROCEDURE — 160035 HCHG PACU - 1ST 60 MINS PHASE I: Performed by: OPHTHALMOLOGY

## 2022-01-25 PROCEDURE — 160029 HCHG SURGERY MINUTES - 1ST 30 MINS LEVEL 4: Performed by: OPHTHALMOLOGY

## 2022-01-25 PROCEDURE — 160041 HCHG SURGERY MINUTES - EA ADDL 1 MIN LEVEL 4: Performed by: OPHTHALMOLOGY

## 2022-01-25 PROCEDURE — 700102 HCHG RX REV CODE 250 W/ 637 OVERRIDE(OP): Performed by: ANESTHESIOLOGY

## 2022-01-25 PROCEDURE — 700101 HCHG RX REV CODE 250: Performed by: OPHTHALMOLOGY

## 2022-01-25 PROCEDURE — 160009 HCHG ANES TIME/MIN: Performed by: OPHTHALMOLOGY

## 2022-01-25 PROCEDURE — A6410 STERILE EYE PAD: HCPCS | Performed by: OPHTHALMOLOGY

## 2022-01-25 PROCEDURE — 500558 HCHG EYE SHIELD W/GARTER (FOX): Performed by: OPHTHALMOLOGY

## 2022-01-25 PROCEDURE — 160046 HCHG PACU - 1ST 60 MINS PHASE II: Performed by: OPHTHALMOLOGY

## 2022-01-25 PROCEDURE — 160025 RECOVERY II MINUTES (STATS): Performed by: OPHTHALMOLOGY

## 2022-01-25 PROCEDURE — 503198 HCHG BACKFLUSH, SOFT TIP: Performed by: OPHTHALMOLOGY

## 2022-01-25 RX ORDER — MEPERIDINE HYDROCHLORIDE 25 MG/ML
12.5 INJECTION INTRAMUSCULAR; INTRAVENOUS; SUBCUTANEOUS
Status: DISCONTINUED | OUTPATIENT
Start: 2022-01-25 | End: 2022-01-25 | Stop reason: HOSPADM

## 2022-01-25 RX ORDER — HYDROMORPHONE HYDROCHLORIDE 1 MG/ML
0.4 INJECTION, SOLUTION INTRAMUSCULAR; INTRAVENOUS; SUBCUTANEOUS
Status: DISCONTINUED | OUTPATIENT
Start: 2022-01-25 | End: 2022-01-25 | Stop reason: HOSPADM

## 2022-01-25 RX ORDER — METOCLOPRAMIDE HYDROCHLORIDE 5 MG/ML
INJECTION INTRAMUSCULAR; INTRAVENOUS PRN
Status: DISCONTINUED | OUTPATIENT
Start: 2022-01-25 | End: 2022-01-25 | Stop reason: SURG

## 2022-01-25 RX ORDER — ONDANSETRON 2 MG/ML
4 INJECTION INTRAMUSCULAR; INTRAVENOUS
Status: DISCONTINUED | OUTPATIENT
Start: 2022-01-25 | End: 2022-01-25 | Stop reason: HOSPADM

## 2022-01-25 RX ORDER — HALOPERIDOL 5 MG/ML
1 INJECTION INTRAMUSCULAR
Status: DISCONTINUED | OUTPATIENT
Start: 2022-01-25 | End: 2022-01-25 | Stop reason: HOSPADM

## 2022-01-25 RX ORDER — CEFAZOLIN SODIUM 1 G/3ML
INJECTION, POWDER, FOR SOLUTION INTRAMUSCULAR; INTRAVENOUS
Status: DISCONTINUED | OUTPATIENT
Start: 2022-01-25 | End: 2022-01-25 | Stop reason: HOSPADM

## 2022-01-25 RX ORDER — BALANCED SALT SOLUTION ENRICHED WITH BICARBONATE, DEXTROSE, AND GLUTATHIONE
KIT INTRAOCULAR
Status: DISCONTINUED
Start: 2022-01-25 | End: 2022-01-25 | Stop reason: HOSPADM

## 2022-01-25 RX ORDER — HYDROMORPHONE HYDROCHLORIDE 1 MG/ML
0.5 INJECTION, SOLUTION INTRAMUSCULAR; INTRAVENOUS; SUBCUTANEOUS
Status: DISCONTINUED | OUTPATIENT
Start: 2022-01-25 | End: 2022-01-25 | Stop reason: HOSPADM

## 2022-01-25 RX ORDER — NEOMYCIN SULFATE, POLYMYXIN B SULFATE, AND DEXAMETHASONE 3.5; 10000; 1 MG/G; [USP'U]/G; MG/G
OINTMENT OPHTHALMIC
Status: DISCONTINUED | OUTPATIENT
Start: 2022-01-25 | End: 2022-01-25 | Stop reason: HOSPADM

## 2022-01-25 RX ORDER — DEXTROSE MONOHYDRATE 25 G/50ML
INJECTION, SOLUTION INTRAVENOUS
Status: DISCONTINUED | OUTPATIENT
Start: 2022-01-25 | End: 2022-01-25 | Stop reason: HOSPADM

## 2022-01-25 RX ORDER — SODIUM CHLORIDE, SODIUM LACTATE, POTASSIUM CHLORIDE, CALCIUM CHLORIDE 600; 310; 30; 20 MG/100ML; MG/100ML; MG/100ML; MG/100ML
INJECTION, SOLUTION INTRAVENOUS CONTINUOUS
Status: DISCONTINUED | OUTPATIENT
Start: 2022-01-25 | End: 2022-01-25 | Stop reason: HOSPADM

## 2022-01-25 RX ORDER — SODIUM CHLORIDE, SODIUM LACTATE, POTASSIUM CHLORIDE, CALCIUM CHLORIDE 600; 310; 30; 20 MG/100ML; MG/100ML; MG/100ML; MG/100ML
INJECTION, SOLUTION INTRAVENOUS CONTINUOUS
Status: ACTIVE | OUTPATIENT
Start: 2022-01-25 | End: 2022-01-25

## 2022-01-25 RX ORDER — LIDOCAINE HYDROCHLORIDE 20 MG/ML
INJECTION, SOLUTION EPIDURAL; INFILTRATION; INTRACAUDAL; PERINEURAL PRN
Status: DISCONTINUED | OUTPATIENT
Start: 2022-01-25 | End: 2022-01-25 | Stop reason: SURG

## 2022-01-25 RX ORDER — MIDAZOLAM HYDROCHLORIDE 1 MG/ML
INJECTION INTRAMUSCULAR; INTRAVENOUS PRN
Status: DISCONTINUED | OUTPATIENT
Start: 2022-01-25 | End: 2022-01-25 | Stop reason: SURG

## 2022-01-25 RX ORDER — DIPHENHYDRAMINE HYDROCHLORIDE 50 MG/ML
12.5 INJECTION INTRAMUSCULAR; INTRAVENOUS
Status: DISCONTINUED | OUTPATIENT
Start: 2022-01-25 | End: 2022-01-25 | Stop reason: HOSPADM

## 2022-01-25 RX ORDER — FLURBIPROFEN SODIUM 0.3 MG/ML
SOLUTION/ DROPS OPHTHALMIC
Status: COMPLETED
Start: 2022-01-25 | End: 2022-01-25

## 2022-01-25 RX ORDER — TROPICAMIDE 10 MG/ML
SOLUTION/ DROPS OPHTHALMIC
Status: COMPLETED
Start: 2022-01-25 | End: 2022-01-25

## 2022-01-25 RX ORDER — HYDRALAZINE HYDROCHLORIDE 20 MG/ML
5 INJECTION INTRAMUSCULAR; INTRAVENOUS
Status: DISCONTINUED | OUTPATIENT
Start: 2022-01-25 | End: 2022-01-25 | Stop reason: HOSPADM

## 2022-01-25 RX ORDER — MOXIFLOXACIN 5 MG/ML
SOLUTION/ DROPS OPHTHALMIC
Status: COMPLETED
Start: 2022-01-25 | End: 2022-01-25

## 2022-01-25 RX ORDER — BALANCED SALT SOLUTION 6.4; .75; .48; .3; 3.9; 1.7 MG/ML; MG/ML; MG/ML; MG/ML; MG/ML; MG/ML
SOLUTION OPHTHALMIC
Status: DISCONTINUED | OUTPATIENT
Start: 2022-01-25 | End: 2022-01-25 | Stop reason: HOSPADM

## 2022-01-25 RX ORDER — ONDANSETRON 2 MG/ML
INJECTION INTRAMUSCULAR; INTRAVENOUS PRN
Status: DISCONTINUED | OUTPATIENT
Start: 2022-01-25 | End: 2022-01-25 | Stop reason: SURG

## 2022-01-25 RX ORDER — OXYCODONE HCL 5 MG/5 ML
5 SOLUTION, ORAL ORAL
Status: DISCONTINUED | OUTPATIENT
Start: 2022-01-25 | End: 2022-01-25 | Stop reason: HOSPADM

## 2022-01-25 RX ORDER — BALANCED SALT SOLUTION ENRICHED WITH BICARBONATE, DEXTROSE, AND GLUTATHIONE
KIT INTRAOCULAR
Status: DISCONTINUED | OUTPATIENT
Start: 2022-01-25 | End: 2022-01-25 | Stop reason: HOSPADM

## 2022-01-25 RX ORDER — PHENYLEPHRINE HYDROCHLORIDE 25 MG/ML
SOLUTION/ DROPS OPHTHALMIC
Status: COMPLETED
Start: 2022-01-25 | End: 2022-01-25

## 2022-01-25 RX ORDER — ACETAMINOPHEN 500 MG
1000 TABLET ORAL ONCE
Status: COMPLETED | OUTPATIENT
Start: 2022-01-25 | End: 2022-01-25

## 2022-01-25 RX ORDER — OXYCODONE HCL 5 MG/5 ML
10 SOLUTION, ORAL ORAL
Status: DISCONTINUED | OUTPATIENT
Start: 2022-01-25 | End: 2022-01-25 | Stop reason: HOSPADM

## 2022-01-25 RX ORDER — HYDROMORPHONE HYDROCHLORIDE 1 MG/ML
0.2 INJECTION, SOLUTION INTRAMUSCULAR; INTRAVENOUS; SUBCUTANEOUS
Status: DISCONTINUED | OUTPATIENT
Start: 2022-01-25 | End: 2022-01-25 | Stop reason: HOSPADM

## 2022-01-25 RX ORDER — DEXAMETHASONE SODIUM PHOSPHATE 4 MG/ML
INJECTION, SOLUTION INTRA-ARTICULAR; INTRALESIONAL; INTRAMUSCULAR; INTRAVENOUS; SOFT TISSUE
Status: DISCONTINUED | OUTPATIENT
Start: 2022-01-25 | End: 2022-01-25 | Stop reason: HOSPADM

## 2022-01-25 RX ORDER — CYCLOPENTOLATE HYDROCHLORIDE 10 MG/ML
SOLUTION/ DROPS OPHTHALMIC
Status: COMPLETED
Start: 2022-01-25 | End: 2022-01-25

## 2022-01-25 RX ADMIN — ACETAMINOPHEN 1000 MG: 500 TABLET ORAL at 10:24

## 2022-01-25 RX ADMIN — FLURBIPROFEN SODIUM 1 DROP: 0.3 SOLUTION/ DROPS OPHTHALMIC at 10:30

## 2022-01-25 RX ADMIN — MOXIFLOXACIN: 5 SOLUTION/ DROPS OPHTHALMIC at 10:30

## 2022-01-25 RX ADMIN — PROPOFOL 250 MG: 10 INJECTION, EMULSION INTRAVENOUS at 10:57

## 2022-01-25 RX ADMIN — FENTANYL CITRATE 50 MCG: 50 INJECTION, SOLUTION INTRAMUSCULAR; INTRAVENOUS at 11:20

## 2022-01-25 RX ADMIN — FENTANYL CITRATE 50 MCG: 50 INJECTION, SOLUTION INTRAMUSCULAR; INTRAVENOUS at 11:06

## 2022-01-25 RX ADMIN — CYCLOPENTOLATE HYDROCHLORIDE 1 DROP: 10 SOLUTION OPHTHALMIC at 10:31

## 2022-01-25 RX ADMIN — ONDANSETRON 4 MG: 2 INJECTION INTRAMUSCULAR; INTRAVENOUS at 11:33

## 2022-01-25 RX ADMIN — TROPICAMIDE 1 DROP: 10 SOLUTION/ DROPS OPHTHALMIC at 10:31

## 2022-01-25 RX ADMIN — MIDAZOLAM HYDROCHLORIDE 2 MG: 1 INJECTION, SOLUTION INTRAMUSCULAR; INTRAVENOUS at 10:53

## 2022-01-25 RX ADMIN — METOCLOPRAMIDE 10 MG: 5 INJECTION, SOLUTION INTRAMUSCULAR; INTRAVENOUS at 11:00

## 2022-01-25 RX ADMIN — FENTANYL CITRATE 100 MCG: 50 INJECTION, SOLUTION INTRAMUSCULAR; INTRAVENOUS at 10:53

## 2022-01-25 RX ADMIN — SODIUM CHLORIDE, POTASSIUM CHLORIDE, SODIUM LACTATE AND CALCIUM CHLORIDE: 600; 310; 30; 20 INJECTION, SOLUTION INTRAVENOUS at 10:25

## 2022-01-25 RX ADMIN — PHENYLEPHRINE HYDROCHLORIDE 1 DROP: 25 SOLUTION/ DROPS OPHTHALMIC at 10:30

## 2022-01-25 RX ADMIN — LIDOCAINE HYDROCHLORIDE 100 MG: 20 INJECTION, SOLUTION EPIDURAL; INFILTRATION; INTRACAUDAL at 10:57

## 2022-01-25 RX ADMIN — FENTANYL CITRATE 50 MCG: 50 INJECTION, SOLUTION INTRAMUSCULAR; INTRAVENOUS at 11:02

## 2022-01-25 ASSESSMENT — PAIN DESCRIPTION - PAIN TYPE
TYPE: SURGICAL PAIN

## 2022-01-25 ASSESSMENT — PAIN SCALES - GENERAL: PAIN_LEVEL: 0

## 2022-01-25 NOTE — ANESTHESIA POSTPROCEDURE EVALUATION
Patient: Real Perry    Procedure Summary     Date: 01/25/22 Room / Location: [facility] ROOM 24 / SURGERY SAME DAY [facility]    Anesthesia Start: 1053 Anesthesia Stop: 1144    Procedure: VITRECTOMY, POSTERIOR PORTION - MEMBRANE PEEL ENDO LASER, GAS (C3F8) (Left Eye) Diagnosis: (VITREOUS HEMHORRHAGE)    Surgeons: Joanne Angel M.D. Responsible Provider: Oleg Alvarez M.D.    Anesthesia Type: general ASA Status: 3          Final Anesthesia Type: general  Last vitals  BP   Blood Pressure: 150/75    Temp   36.1 °C (97 °F)    Pulse   72   Resp   16    SpO2   93 %      Anesthesia Post Evaluation    Patient location during evaluation: PACU  Patient participation: complete - patient participated  Level of consciousness: awake and alert  Pain score: 0    Airway patency: patent  Anesthetic complications: no  Cardiovascular status: hemodynamically stable  Respiratory status: acceptable  Hydration status: euvolemic    PONV: none          No complications documented.     Nurse Pain Score: 0 (NPRS)

## 2022-01-25 NOTE — OR NURSING
1142- Pt to PACU from OR. Report from anesthesia and OR RN. On 6L O2 via mask. Oral airway in place. Respirations even and unlabored. VSS. Eye shield to left eye, CDI.    1146- Post-op BG=84. Dr. Alvarez made aware. Plan to give PO juice once awake and able to tolerate PO intake.    1215- Report to Ileana BURDEN    1245- Report back from Ileana RN, care reassumed. Pt dressed and in recliner chair. Phase II criteria met. Ride called.    1410- PIV removed with tip intact. Discharge instructions discussed. Verbalized understanding.    1415- Pt escorted out of department in wheelchair with all belongings. Discharged home to responsible adult.

## 2022-01-25 NOTE — DISCHARGE INSTRUCTIONS
"  ACTIVITY: Rest and take it easy for the first 24 hours.  A responsible adult is recommended to remain with you during that time.  It is normal to feel sleepy.  We encourage you to not do anything that requires balance, judgment or coordination.    MILD FLU-LIKE SYMPTOMS ARE NORMAL. YOU MAY EXPERIENCE GENERALIZED MUSCLE ACHES, THROAT IRRITATION, HEADACHE AND/OR SOME NAUSEA.    FOR 24 HOURS DO NOT:  Drive, operate machinery or run household appliances.  Drink beer or alcoholic beverages.   Make important decisions or sign legal documents.    SPECIAL INSTRUCTIONS: Follow instructions from Dr. Angel. Keep head positioned down (\"nose to toes\") at all times!    DIET: To avoid nausea, slowly advance diet as tolerated, avoiding spicy or greasy foods for the first day.  Add more substantial food to your diet according to your physician's instructions.  INCREASE FLUIDS AND FIBER TO AVOID CONSTIPATION.    SURGICAL DRESSING/BATHING: Keep eye shield clean, dry, and in place until tomorrow's appointment. Do not get wet.    FOLLOW-UP APPOINTMENT:  Go to pre-arranged appointment tomorrow (1/26/22) at 11am at Dr. Angel's office.    You should CALL YOUR PHYSICIAN if you develop:  Fever greater than 101 degrees F.  Pain not relieved by medication, or persistent nausea or vomiting.  Excessive bleeding (blood soaking through dressing) or unexpected drainage from the wound.  Extreme redness or swelling around the incision site, drainage of pus or foul smelling drainage.  Inability to urinate or empty your bladder within 8 hours.  Problems with breathing or chest pain.    You should call 911 if you develop problems with breathing or chest pain.  If you are unable to contact your doctor or surgical center, you should go to the nearest emergency room or urgent care center.    Dr. Angel's telephone #: [phone]    If any questions arise, call your doctor.  If your doctor is not available, please feel free to call the Surgical " Center at [phone].     A registered nurse may call you a few days after your surgery to see how you are doing after your procedure.    MEDICATIONS: Resume taking daily medication.  Take prescribed pain medication with food.  If no medication is prescribed, you may take non-aspirin pain medication if needed.  PAIN MEDICATION CAN BE VERY CONSTIPATING.  Take a stool softener or laxative such as senokot, pericolace, or milk of magnesia if needed.    Last pain medication given at __________.  Tylenol given at 10:30am. You may take tylenol if needed again at 4:30pm.  You may take ibuprofen/Motrin if needed at any time.      Depression / Suicide Risk    As you are discharged from this [facility] facility, it is important to learn how to keep safe from harming yourself.    Recognize the warning signs:  · Abrupt changes in personality, positive or negative- including increase in energy   · Giving away possessions  · Change in eating patterns- significant weight changes-  positive or negative  · Change in sleeping patterns- unable to sleep or sleeping all the time   · Unwillingness or inability to communicate  · Depression  · Unusual sadness, discouragement and loneliness  · Talk of wanting to die  · Neglect of personal appearance   · Rebelliousness- reckless behavior  · Withdrawal from people/activities they love  · Confusion- inability to concentrate     If you or a loved one observes any of these behaviors or has concerns about self-harm, here's what you can do:  · Talk about it- your feelings and reasons for harming yourself  · Remove any means that you might use to hurt yourself (examples: pills, rope, extension cords, firearm)  · Get professional help from the community (Mental Health, Substance Abuse, psychological counseling)  · Do not be alone:Call your Safe Contact- someone whom you trust who will be there for you.  · Call your local CRISIS HOTLINE 801-7132 or 797-262-8572  · Call your local Children's  Mobile Crisis Response Team [place] (763) 582-4889 or www.[employer]  · Call the toll free National Suicide Prevention Hotlines   · National Suicide Prevention Lifeline 948-474-FUSW (5110)  · National Hope Line Network 800-SUICIDE (796-1307)

## 2022-01-25 NOTE — ANESTHESIA TIME REPORT
Anesthesia Start and Stop Event Times     Date Time Event    1/25/2022 1022 Ready for Procedure     1053 Anesthesia Start     1144 Anesthesia Stop        Responsible Staff  01/25/22    Name Role Begin End    Oleg Alvarez M.D. Anesth 1053 1144        Preop Diagnosis (Free Text):  Pre-op Diagnosis     VITREOUS HEMHORRHAGE, left eye        Preop Diagnosis (Codes):    Premium Reason  Non-Premium    Comments:

## 2022-01-25 NOTE — ANESTHESIA PROCEDURE NOTES
Airway    Date/Time: 1/25/2022 10:57 AM  Performed by: Oleg Alvarez M.D.  Authorized by: Oleg Alvarez M.D.     Location:  OR  Urgency:  Elective  Indications for Airway Management:  Anesthesia      Spontaneous Ventilation: absent    Sedation Level:  Deep  Preoxygenated: Yes    Final Airway Type:  Supraglottic airway  Final Supraglottic Airway:  Flexible LMA    SGA Size:  4  Number of Attempts at Approach:  1   Atraumatic insertion, good seal

## 2022-01-25 NOTE — ANESTHESIA PREPROCEDURE EVALUATION
Case: [id number] Date/Time: 01/25/22 1215    Procedure: VITRECTOMY, POSTERIOR PORTION - MEMBRANE PEEL ENDO LASER, POSSIBLE GAS (Left )    Pre-op diagnosis: VITREOUS HEMHORRHAGE    Location: [facility] ROOM 24 / SURGERY SAME DAY [facility]    Surgeons: Joanne Angel M.D.        HTN (poorly controlled, significantly elevated in pre-op), morbid obesity, CKD (current creat 3.11 with last creat on file 1.9 from 2019), cardiomyopathy with LVEF 55%. DM type 2, glucose currently 82 (asymptomatic), will recheck in PACU and not give any D50 at this time as this should be a short case. Pt is a current smoker.    Physical Exam    Airway   Mallampati: II  TM distance: >3 FB  Neck ROM: full       Cardiovascular - normal exam  Rhythm: regular  Rate: normal  (-) murmur     Dental - normal exam           Pulmonary - normal exam  Breath sounds clear to auscultation     Abdominal    Neurological - normal exam                 Anesthesia Plan    ASA 3   ASA physical status 3 criteria: hypertension - poorly controlled    Plan - general       Airway plan will be LMA        Plan Factors:   Patient was previously instructed to abstain from smoking on day of procedure.  Patient did not smoke on day of procedure.      Induction: intravenous    Postoperative Plan: Postoperative administration of opioids is intended.    Pertinent diagnostic labs and testing reviewed    Informed Consent:    Anesthetic plan and risks discussed with patient.    Use of blood products discussed with: patient whom consented to blood products.

## 2022-01-25 NOTE — OP REPORT
SURGEON:  Joanne Angel MD     ASSISTANT:  None.       PROCEDURES:  A 23-gauge pars plana vitrectomy, membrane peel, endolaser and 10% C3F8 left eye     PREOPERATIVE DIAGNOSIS:  Vitreous hemorrhage left eye     POSTOPERATIVE DIAGNOSIS:  Vitreous hemorrhage and tractional retinal detachment left eye     ANESTHESIA:  General endotracheal.       FLUIDS:  See anesthesia note.       COMPLICATIONS:  None.       DETAILS OF THE PROCEDURE:  Correct eye was marked in the preoperative area.    Patient was taken to the operating room.  General anesthesia induced by anesthesiology and the patient was prepped and draped in the usual sterile ophthalmic fashion.  Site was marked 4 mm from the limbus in the inferotemporal quadrant.  A 23-gauge trocar was used in a beveled fashion to enter the vitreous cavity.  Infusion was placed in the eye, visualized with the light pipe and turned on.  One site superonasally and another site superotemporally were then marked 4 mm from the limbus.  A 23-gauge trocar was used in a beveled fashion to enter the vitreous cavity.  Light pipe and vitrector were inserted inside the eye.  Core and peripheral vitrectomy was performed to remove the vitreous hemorrhage. We noticed a retinal tear superior temporal to fovea and a traction detachment nasally with membranes. We used forceps to remove the membranes. Air-fluid exchange was then performed and endolaser was applied around the tears as well as 360 in the periphery.Trocars were then removed.  There was no leak. Postop Ancef and dexamethasone were injected subconjunctivally.  Drapes were then removed.  Patient's face was cleaned, ointment applied to the eye.  Eye was patched and shielded. Patient was taken to the recovery room in good condition.  There were no complications.

## 2022-05-15 ENCOUNTER — HOSPITAL ENCOUNTER (EMERGENCY)
Facility: MEDICAL CENTER | Age: 47
End: 2022-05-15
Attending: EMERGENCY MEDICINE
Payer: COMMERCIAL

## 2022-05-15 VITALS
OXYGEN SATURATION: 93 % | HEART RATE: 84 BPM | BODY MASS INDEX: 40.61 KG/M2 | SYSTOLIC BLOOD PRESSURE: 191 MMHG | TEMPERATURE: 98 F | RESPIRATION RATE: 16 BRPM | DIASTOLIC BLOOD PRESSURE: 89 MMHG | WEIGHT: 259.26 LBS

## 2022-05-15 DIAGNOSIS — I10 HYPERTENSION, UNSPECIFIED TYPE: ICD-10-CM

## 2022-05-15 DIAGNOSIS — Z76.0 MEDICATION REFILL: ICD-10-CM

## 2022-05-15 DIAGNOSIS — H53.8 BLURRED VISION: ICD-10-CM

## 2022-05-15 LAB
ALBUMIN SERPL BCP-MCNC: 3.4 G/DL (ref 3.2–4.9)
ALBUMIN/GLOB SERPL: 1.3 G/DL
ALP SERPL-CCNC: 75 U/L (ref 30–99)
ALT SERPL-CCNC: 18 U/L (ref 2–50)
ANION GAP SERPL CALC-SCNC: 15 MMOL/L (ref 7–16)
AST SERPL-CCNC: 20 U/L (ref 12–45)
BASOPHILS # BLD AUTO: 0.9 % (ref 0–1.8)
BASOPHILS # BLD: 0.11 K/UL (ref 0–0.12)
BILIRUB SERPL-MCNC: 0.2 MG/DL (ref 0.1–1.5)
BUN SERPL-MCNC: 49 MG/DL (ref 8–22)
CALCIUM SERPL-MCNC: 8.5 MG/DL (ref 8.5–10.5)
CHLORIDE SERPL-SCNC: 108 MMOL/L (ref 96–112)
CO2 SERPL-SCNC: 19 MMOL/L (ref 20–33)
CREAT SERPL-MCNC: 4 MG/DL (ref 0.5–1.4)
EOSINOPHIL # BLD AUTO: 0.26 K/UL (ref 0–0.51)
EOSINOPHIL NFR BLD: 2.2 % (ref 0–6.9)
ERYTHROCYTE [DISTWIDTH] IN BLOOD BY AUTOMATED COUNT: 43.5 FL (ref 35.9–50)
GFR SERPLBLD CREATININE-BSD FMLA CKD-EPI: 17 ML/MIN/1.73 M 2
GLOBULIN SER CALC-MCNC: 2.7 G/DL (ref 1.9–3.5)
GLUCOSE SERPL-MCNC: 76 MG/DL (ref 65–99)
HCT VFR BLD AUTO: 37.1 % (ref 42–52)
HGB BLD-MCNC: 12.7 G/DL (ref 14–18)
IMM GRANULOCYTES # BLD AUTO: 0.04 K/UL (ref 0–0.11)
IMM GRANULOCYTES NFR BLD AUTO: 0.3 % (ref 0–0.9)
LYMPHOCYTES # BLD AUTO: 2.15 K/UL (ref 1–4.8)
LYMPHOCYTES NFR BLD: 17.9 % (ref 22–41)
MCH RBC QN AUTO: 30.4 PG (ref 27–33)
MCHC RBC AUTO-ENTMCNC: 34.2 G/DL (ref 33.7–35.3)
MCV RBC AUTO: 88.8 FL (ref 81.4–97.8)
MONOCYTES # BLD AUTO: 0.77 K/UL (ref 0–0.85)
MONOCYTES NFR BLD AUTO: 6.4 % (ref 0–13.4)
NEUTROPHILS # BLD AUTO: 8.65 K/UL (ref 1.82–7.42)
NEUTROPHILS NFR BLD: 72.3 % (ref 44–72)
NRBC # BLD AUTO: 0 K/UL
NRBC BLD-RTO: 0 /100 WBC
PLATELET # BLD AUTO: 314 K/UL (ref 164–446)
PMV BLD AUTO: 10.1 FL (ref 9–12.9)
POTASSIUM SERPL-SCNC: 3.6 MMOL/L (ref 3.6–5.5)
PROT SERPL-MCNC: 6.1 G/DL (ref 6–8.2)
RBC # BLD AUTO: 4.18 M/UL (ref 4.7–6.1)
SODIUM SERPL-SCNC: 142 MMOL/L (ref 135–145)
WBC # BLD AUTO: 12 K/UL (ref 4.8–10.8)

## 2022-05-15 PROCEDURE — 700101 HCHG RX REV CODE 250: Performed by: EMERGENCY MEDICINE

## 2022-05-15 PROCEDURE — 94760 N-INVAS EAR/PLS OXIMETRY 1: CPT

## 2022-05-15 PROCEDURE — 80053 COMPREHEN METABOLIC PANEL: CPT

## 2022-05-15 PROCEDURE — 36415 COLL VENOUS BLD VENIPUNCTURE: CPT

## 2022-05-15 PROCEDURE — 85025 COMPLETE CBC W/AUTO DIFF WBC: CPT

## 2022-05-15 PROCEDURE — 99284 EMERGENCY DEPT VISIT MOD MDM: CPT

## 2022-05-15 PROCEDURE — A9270 NON-COVERED ITEM OR SERVICE: HCPCS | Performed by: EMERGENCY MEDICINE

## 2022-05-15 PROCEDURE — 700102 HCHG RX REV CODE 250 W/ 637 OVERRIDE(OP): Performed by: EMERGENCY MEDICINE

## 2022-05-15 RX ORDER — GLIPIZIDE 5 MG/1
5 TABLET ORAL 2 TIMES DAILY
Qty: 60 TABLET | Refills: 0 | Status: SHIPPED | OUTPATIENT
Start: 2022-05-15 | End: 2022-06-14

## 2022-05-15 RX ORDER — AMLODIPINE BESYLATE 10 MG/1
10 TABLET ORAL DAILY
Qty: 30 TABLET | Refills: 0 | Status: SHIPPED
Start: 2022-05-15 | End: 2022-06-14

## 2022-05-15 RX ORDER — LISINOPRIL 20 MG/1
40 TABLET ORAL DAILY
Qty: 60 TABLET | Refills: 0 | Status: SHIPPED | OUTPATIENT
Start: 2022-05-15 | End: 2022-06-14

## 2022-05-15 RX ORDER — ATORVASTATIN CALCIUM 40 MG/1
40 TABLET, FILM COATED ORAL NIGHTLY
Qty: 30 TABLET | Refills: 0 | Status: SHIPPED | OUTPATIENT
Start: 2022-05-15 | End: 2022-05-15 | Stop reason: SDUPTHER

## 2022-05-15 RX ORDER — AMLODIPINE BESYLATE 10 MG/1
10 TABLET ORAL DAILY
Qty: 30 TABLET | Refills: 0 | Status: SHIPPED | OUTPATIENT
Start: 2022-05-15 | End: 2022-05-15 | Stop reason: SDUPTHER

## 2022-05-15 RX ORDER — LISINOPRIL 20 MG/1
40 TABLET ORAL ONCE
Status: COMPLETED | OUTPATIENT
Start: 2022-05-15 | End: 2022-05-15

## 2022-05-15 RX ORDER — ATORVASTATIN CALCIUM 40 MG/1
40 TABLET, FILM COATED ORAL NIGHTLY
Qty: 30 TABLET | Refills: 0 | Status: SHIPPED | OUTPATIENT
Start: 2022-05-15 | End: 2022-06-14

## 2022-05-15 RX ORDER — GLIPIZIDE 5 MG/1
5 TABLET ORAL 2 TIMES DAILY
Qty: 60 TABLET | Refills: 0 | Status: SHIPPED | OUTPATIENT
Start: 2022-05-15 | End: 2022-05-15 | Stop reason: SDUPTHER

## 2022-05-15 RX ORDER — LISINOPRIL 20 MG/1
40 TABLET ORAL DAILY
Qty: 60 TABLET | Refills: 0 | Status: SHIPPED | OUTPATIENT
Start: 2022-05-15 | End: 2022-05-15 | Stop reason: SDUPTHER

## 2022-05-15 RX ORDER — PROPARACAINE HYDROCHLORIDE 5 MG/ML
1 SOLUTION/ DROPS OPHTHALMIC ONCE
Status: COMPLETED | OUTPATIENT
Start: 2022-05-15 | End: 2022-05-15

## 2022-05-15 RX ADMIN — LISINOPRIL 40 MG: 20 TABLET ORAL at 20:13

## 2022-05-15 RX ADMIN — PROPARACAINE HYDROCHLORIDE 1 DROP: 5 SOLUTION/ DROPS OPHTHALMIC at 20:45

## 2022-05-16 NOTE — DISCHARGE PLANNING
Medical Social Work    MSW received a  order from [facility] and spoke with bedside RN.  Pt is having issues with transportation.  Per chart pt has [employer] for insurance and not Medicaid; therefore, he doesn't qualify for MTM.  MSW provide RN with the following possible options for pt: uber, lyft and possibly [employer]; however, per records pt lives in [place].  It was recommended that if pt's [employer] insurance is not active that he apply for [place] Medicaid that will increase his transportation resources.     [Use of Plain Language] : use of plain language [Adequate] : adequate [None] : none

## 2022-05-16 NOTE — ED NOTES
Spoke to   Due to pt not being on medicaid and having [employer] insurance, there are not transportation help options that can be given to pt  Will speak to md

## 2022-05-16 NOTE — ED NOTES
Pt aox4, nad, ambulatory steady  Friend present for ride home  Pt understood all dc info and when to seek medical care, no further questions

## 2022-05-16 NOTE — ED NOTES
Pt unable to read largest letter correctly on visual acuity test  States L eye seems more cloudy/blurry than right

## 2022-05-16 NOTE — ED TRIAGE NOTES
"Chief Complaint   Patient presents with   • Blurred Vision     Bilateral eyes - started yesterday afternoon - constant, denies headaches- pt has high blood pressure in triage, pt has not been taking his blood pressure medications regularly, pt states he ran out and is stretching out his supply. Pt states he has had blurred vision in the past and fears its diabetic Retinopathy, but had insurance issues and was unable to confirm this. Pt states years ago he had \"the blood cleared from his eye and had shots in it\"         "

## 2022-05-16 NOTE — ED PROVIDER NOTES
"ED Provider Note    CHIEF COMPLAINT  Chief Complaint   Patient presents with   • Blurred Vision     Bilateral eyes - started yesterday afternoon - constant, denies headaches- pt has high blood pressure in triage, pt has not been taking his blood pressure medications regularly, pt states he ran out and is stretching out his supply. Pt states he has had blurred vision in the past and fears its diabetic Retinopathy, but had insurance issues and was unable to confirm this. Pt states years ago he had \"the blood cleared from his eye and had shots in it\"          [facility]    Primary care provider: Justine Bates M.D.   History obtained from: Patient and friend  History limited by: None     Real Perry is a 46 y.o. male who presents to the ED with friend complaining of bilateral blurry vision that started yesterday afternoon.  He denies any pain.  He denies weakness or other sensory change.  No fever/recent illness/congestion/sore throat/cough/shortness of breath or difficulty breathing/nausea/vomiting/diarrhea/constipation/dysuria/rash/edema.  He reports he has been taking his lisinopril perhaps every other day to make it last longer because of difficulty getting refills.  He has not had any recent changes to his medications otherwise.  He also reports having vitrectomy earlier this year due to diabetic retinopathy.  He does not wear glasses or contact lenses.  No injury or trauma.  He reports that he is unable to make his appointments because his vision is so bad that he cannot drive and he has difficulty arranging transportation.    REVIEW OF SYSTEMS  Please see HPI for pertinent positives/negatives.  All other systems reviewed and are negative.     PAST MEDICAL HISTORY  Past Medical History:   Diagnosis Date   • Pneumonia 10/15/2021   • Diabetes     Diet and oral medications   • High cholesterol    • Hypertension    • Psychiatric problem     Has low emotional periods   • Renal disorder     High strain    "     SURGICAL HISTORY  Past Surgical History:   Procedure Laterality Date   • VITRECTOMY POSTERIOR Left 1/25/2022    Procedure: VITRECTOMY, POSTERIOR PORTION - MEMBRANE PEEL ENDO LASER, GAS (C3F8);  Surgeon: Joanne Angel M.D.;  Location: SURGERY SAME DAY [facility];  Service: Ophthalmology   • OPEN REDUCTION Right 07/1991    Foot        SOCIAL HISTORY  Social History     Tobacco Use   • Smoking status: Current Every Day Smoker     Packs/day: 1.00     Years: 22.00     Pack years: 22.00     Types: Cigarettes   • Smokeless tobacco: Never Used   Vaping Use   • Vaping Use: Never used   Substance and Sexual Activity   • Alcohol use: Yes     Comment: 1-2 year   • Drug use: No   • Sexual activity: Yes     Partners: Female        FAMILY HISTORY  Family History   Problem Relation Age of Onset   • Stroke Father    • Cancer Father         prostate   • Diabetes Mother    • Diabetes Maternal Grandmother    • Cancer Paternal Grandfather         lung and prostate        CURRENT MEDICATIONS  Home Medications     Reviewed by Lisa Fernando R.N. (Registered Nurse) on 05/15/22 at 2012  Med List Status: Partial   Medication Last Dose Status   amLODIPine (NORVASC) 10 MG Tab  Active   atorvastatin (LIPITOR) 40 MG Tab  Active   glipiZIDE (GLUCOTROL) 5 MG Tab  Active   lisinopril (PRINIVIL) 10 MG Tab  Active   therapeutic multivitamin-minerals (THERAGRAN-M) Tab  Active                 ALLERGIES  No Known Allergies     PHYSICAL EXAM  VITAL SIGNS: BP (!) 191/89   Pulse 84   Temp 36.7 °C (98 °F)   Resp 16   Wt 118 kg (259 lb 4.2 oz)   SpO2 93%   BMI 40.61 kg/m²  @MAVIS[[id number]::@     Pulse ox interpretation: 96% I interpret this pulse ox as normal     Constitutional: Well developed, well nourished, alert in no apparent distress, nontoxic appearance    HENT: No external signs of trauma, normocephalic, oropharynx moist and clear, nose normal    Eyes: PERRL, EOMI, patient able to make out finger movements bilaterally, visual fields appear  grossly intact bilaterally, conjunctiva without erythema, no discharge, no icterus, undilated funduscopic exam without gross hemorrhage  Neck: Soft and supple, trachea midline, no stridor, no tenderness, no LAD, no JVD, good ROM    Cardiovascular: Regular rate and rhythm, no murmurs/rubs/gallops, strong distal pulses and good perfusion    Thorax & Lungs: No respiratory distress, CTAB   Abdomen: Soft, nontender, nondistended, no guarding, no rebound, normal BS    Back: No CVAT    Extremities: No cyanosis, no edema, no gross deformity, good ROM, no tenderness, intact distal pulses with brisk cap refill    Skin: Warm, dry, no pallor/cyanosis, no rash noted    Lymphatic: No lymphadenopathy noted    Neuro: Alert and oriented to person, place, and time.  GCS 15.  CN II-XII grossly intact.  Normal speech.  Equal strength bilateral UE/LE.  Sensation intact to touch.  No cerebellar signs.  Normal gait.    Psychiatric: Cooperative, normal mood and affect, normal judgement, appropriate for clinical situation        NIH STROKE SCALE    1A: Level of Consciousness=0  Alert; keenly responsive 0  Arouses to minor stimulation +1  Requires repeated stimulation to arouse +2  Movements to pain +2  Postures or unresponsive +3    1B: Ask Month and Age=0  Both questions right 0  1 question right +1  0 questions right +2  Dysarthric/intubated/trauma/language barrier +1  Aphasic +2    1C: 'Blink Eyes' & 'Squeeze Hands'=0  (Pantomime commands if communication barrier)  Performs both tasks 0  Performs 1 task +1  Performs 0 tasks +2    2: Test Horizontal Extraocular Movements=0  Normal 0  Partial gaze palsy: can be overcome +1  Partial gaze palsy: corrects with oculocephalic reflex +1  Forced gaze palsy: cannot be overcome +2    3: Test Visual Fields=0  No visual loss 0  Partial hemianopia +1  Complete hemianopia +2  Patient is bilaterally blind +3  Bilateral hemianopia +3    4: Test Facial Palsy=0  (Use grimace if obtunded)  Normal symmetry  0  Minor paralysis (flat nasolabial fold, smile asymetry) +1  Partial paralysis (lower face) +2  Unilateral complete paralysis (upper/lower face) +3  Bilateral complete paralysis (upper/lower face) +3    5A: Test Left Arm Motor Drift=0  No drift for 10 Seconds 0  Drift, but doesn't hit bed +1  Drift, hits bed +2  Some effort against gravity +2  No effort against gravity +3  No movement +4  Amputation/joint fusion 0    5B: Test Right Arm Motor Drift=0  No drift for 10 seconds 0  Drift, but doesn't hit bed +1  Drift, hits bed +2  Some effort against gravity +2  No effort against gravity +3  No movement +4  Amputation/joint fusion 0    6A: Test Left Leg Motor Drift=0  No drift for 5 Seconds 0  Drift, but doesn't hit bed +1  Drift, hits bed +2  Some effort against gravity +2  No effort against gravity +3  No movement +4  Amputation/joint fusion 0    6B: Test Right Leg Motor Drift=0  No drift for 5 Seconds 0  Drift, but doesn't hit bed +1  Drift, hits bed +2  Some effort against gravity +2  No effort against gravity +3  No movement +4  Amputation/joint fusion 0    7: Test Limb Ataxia =0  (FNF/Heel-Shin)  No ataxia 0  Ataxia in 1 limb +1  Ataxia in 2 limbs +2  Does not understand 0  Paralyzed 0  Amputation/joint fusion 0    8: Test Sensation=0  Normal; no sensory loss 0  Mild-moderate loss: less sharp/more dull +1  Mild-moderate loss: can sense being touched +1  Complete loss: cannot sense being touched at all +2  No response and quadriplegic +2  Coma/unresponsive +2    9: Test Language/Aphasia=0  Normal; no aphasia 0  Mild-moderate aphasia: some obvious changes, without significant limitation +1  Severe aphasia: fragmentary expression, inference needed, cannot identify materials +2  Mute/global aphasia: no usable speech/auditory comprehension +3  Coma/unresponsive +3    10: Test Dysarthria=0  Normal 0  Mild-moderate dysarthria: slurring but can be understood +1  Severe dysarthria: unintelligble slurring or out of  proportion to dysphasia +2  Mute/anarthric +2  Intubated/unable to test 0    11: Test Extinction/Inattention=0  No abnormality 0  Visual/tactile/auditory/spatial/personal inattention +1  Extinction to bilateral simultaneous stimulation +1  Profound cristo-inattention (ex: does not recognize own hand) +2  Extinction to >1 modality +2      NIH Stroke Scale=0        DIAGNOSTIC STUDIES / PROCEDURES    IOP measurement using Harpreet-Pen:  R 20  L 15    LABS  All labs reviewed by me.     Results for orders placed or performed during the hospital encounter of 05/15/22   CBC WITH DIFFERENTIAL   Result Value Ref Range    WBC 12.0 (H) 4.8 - 10.8 K/uL    RBC 4.18 (L) 4.70 - 6.10 M/uL    Hemoglobin 12.7 (L) 14.0 - 18.0 g/dL    Hematocrit 37.1 (L) 42.0 - 52.0 %    MCV 88.8 81.4 - 97.8 fL    MCH 30.4 27.0 - 33.0 pg    MCHC 34.2 33.7 - 35.3 g/dL    RDW 43.5 35.9 - 50.0 fL    Platelet Count 314 164 - 446 K/uL    MPV 10.1 9.0 - 12.9 fL    Neutrophils-Polys 72.30 (H) 44.00 - 72.00 %    Lymphocytes 17.90 (L) 22.00 - 41.00 %    Monocytes 6.40 0.00 - 13.40 %    Eosinophils 2.20 0.00 - 6.90 %    Basophils 0.90 0.00 - 1.80 %    Immature Granulocytes 0.30 0.00 - 0.90 %    Nucleated RBC 0.00 /100 WBC    Neutrophils (Absolute) 8.65 (H) 1.82 - 7.42 K/uL    Lymphs (Absolute) 2.15 1.00 - 4.80 K/uL    Monos (Absolute) 0.77 0.00 - 0.85 K/uL    Eos (Absolute) 0.26 0.00 - 0.51 K/uL    Baso (Absolute) 0.11 0.00 - 0.12 K/uL    Immature Granulocytes (abs) 0.04 0.00 - 0.11 K/uL    NRBC (Absolute) 0.00 K/uL   COMP METABOLIC PANEL   Result Value Ref Range    Sodium 142 135 - 145 mmol/L    Potassium 3.6 3.6 - 5.5 mmol/L    Chloride 108 96 - 112 mmol/L    Co2 19 (L) 20 - 33 mmol/L    Anion Gap 15.0 7.0 - 16.0    Glucose 76 65 - 99 mg/dL    Bun 49 (H) 8 - 22 mg/dL    Creatinine 4.00 (H) 0.50 - 1.40 mg/dL    Calcium 8.5 8.5 - 10.5 mg/dL    AST(SGOT) 20 12 - 45 U/L    ALT(SGPT) 18 2 - 50 U/L    Alkaline Phosphatase 75 30 - 99 U/L    Total Bilirubin 0.2 0.1 - 1.5  mg/dL    Albumin 3.4 3.2 - 4.9 g/dL    Total Protein 6.1 6.0 - 8.2 g/dL    Globulin 2.7 1.9 - 3.5 g/dL    A-G Ratio 1.3 g/dL   ESTIMATED GFR   Result Value Ref Range    GFR (CKD-EPI) 17 (A) >60 mL/min/1.73 m 2        RADIOLOGY  The radiologist's interpretation of all radiological studies have been reviewed by me.     No orders to display          COURSE & MEDICAL DECISION MAKING  Nursing notes, VS, PMSFHx reviewed in chart.     Review of past medical records shows the patient was last admitted to this hospital January 25, 2022 for vitrectomy.  He was last admitted to this hospital June 24, 2019 for left eye blurry vision and uncontrolled blood pressure and discharged on June 28, 2019.      Differential diagnoses considered include but are not limited to: Retinopathy, glaucoma, central retinal artery/vein occlusion, medication noncompliance, uncontrolled hypertension       History and physical exam as above.  Patient noted to have elevated blood pressure and reports that he has been rationing his lisinopril.  He reports that due to his chronic poor vision and inability to drive, he has difficulty with appointments to see his doctor for refills due to transportation issues.  He reports increased blurry vision in both eyes.  He does not have other focal neurological findings on exam to suggest CVA and he denies any pain.  Symptomology does not suggest intracranial bleed.  IOP measurements do not indicate glaucoma.  I did not see gross hemorrhage on undilated funduscopic exam.  He received a dose of lisinopril in the ED and was monitored and remained clinically stable.  Blood pressure improved slightly.  Mild leukocytosis is likely stress reaction without evidence for acute infectious etiology.  Other lab abnormalities appear to be chronic compared to his prior results.  I discussed the findings with the patient.  This is a very pleasant well-appearing patient in no acute distress and nontoxic in appearance.  He is  mainly concerned with getting refills of his medications which will be given.  He was advised on follow-up with his eye doctor tomorrow.  He will need outpatient follow-up for better management of his hypertension as well as other chronic issues.  Return to ED precautions were given.  Patient verbalized understanding and agreed to plan of care with no further questions or concerns.      The patient is referred to a primary physician for blood pressure management, diabetic screening, and for all other preventative health concerns.       FINAL IMPRESSION  1. Blurred vision Acute   2. Hypertension, unspecified type Active   3. Medication refill Active          DISPOSITION  Patient will be discharged home in stable condition.       FOLLOW UP  Justine Bates M.D.  [address] [place] [id number] [phone]    Call in 1 day      Please follow-up with your eye doctor    Call in 1 day      [facility], Emergency Dept  [facility] [facility] [id number] [phone]    If symptoms worsen         OUTPATIENT MEDICATIONS  Discharge Medication List as of 5/15/2022 10:53 PM             Electronically signed by: Candelario Nowak D.O., 5/15/2022 7:37 PM      Portions of this record were made with voice recognition software.  Despite my review, spelling/grammar/context errors may still remain.  Interpretation of this chart should be taken in this context.

## 2022-06-29 ENCOUNTER — OFFICE VISIT (OUTPATIENT)
Dept: MEDICAL GROUP | Facility: PHYSICIAN GROUP | Age: 47
End: 2022-06-29
Payer: COMMERCIAL

## 2022-06-29 VITALS
SYSTOLIC BLOOD PRESSURE: 166 MMHG | TEMPERATURE: 98.7 F | HEIGHT: 68 IN | BODY MASS INDEX: 40.01 KG/M2 | WEIGHT: 264 LBS | OXYGEN SATURATION: 97 % | HEART RATE: 82 BPM | DIASTOLIC BLOOD PRESSURE: 90 MMHG | RESPIRATION RATE: 16 BRPM

## 2022-06-29 DIAGNOSIS — E78.5 HYPERLIPIDEMIA, UNSPECIFIED HYPERLIPIDEMIA TYPE: ICD-10-CM

## 2022-06-29 DIAGNOSIS — F43.9 STRESS: ICD-10-CM

## 2022-06-29 DIAGNOSIS — Z00.00 ENCOUNTER FOR MEDICAL EXAMINATION TO ESTABLISH CARE: ICD-10-CM

## 2022-06-29 DIAGNOSIS — I10 PRIMARY HYPERTENSION: ICD-10-CM

## 2022-06-29 DIAGNOSIS — E08.319 DIABETIC RETINOPATHY ASSOCIATED WITH DIABETES MELLITUS DUE TO UNDERLYING CONDITION, MACULAR EDEMA PRESENCE UNSPECIFIED, UNSPECIFIED LATERALITY, UNSPECIFIED RETINOPATHY SEVERITY (HCC): ICD-10-CM

## 2022-06-29 DIAGNOSIS — Z59.6 INCOME INSUFFICIENT TO MEET NEEDS: ICD-10-CM

## 2022-06-29 PROCEDURE — 99214 OFFICE O/P EST MOD 30 MIN: CPT | Performed by: NURSE PRACTITIONER

## 2022-06-29 RX ORDER — AMLODIPINE BESYLATE 10 MG/1
10 TABLET ORAL DAILY
Qty: 90 TABLET | Refills: 3 | Status: SHIPPED | OUTPATIENT
Start: 2022-06-29 | End: 2023-04-15 | Stop reason: SDUPTHER

## 2022-06-29 RX ORDER — LISINOPRIL 40 MG/1
40 TABLET ORAL DAILY
Qty: 90 TABLET | Refills: 3 | Status: SHIPPED | OUTPATIENT
Start: 2022-06-29 | End: 2023-04-15 | Stop reason: SDUPTHER

## 2022-06-29 RX ORDER — ATORVASTATIN CALCIUM 40 MG/1
40 TABLET, FILM COATED ORAL EVERY EVENING
Qty: 90 TABLET | Refills: 3 | Status: SHIPPED | OUTPATIENT
Start: 2022-06-29

## 2022-06-29 SDOH — ECONOMIC STABILITY - INCOME SECURITY: LOW INCOME: Z59.6

## 2022-06-29 ASSESSMENT — PATIENT HEALTH QUESTIONNAIRE - PHQ9
5. POOR APPETITE OR OVEREATING: 1 - SEVERAL DAYS
CLINICAL INTERPRETATION OF PHQ2 SCORE: 2
SUM OF ALL RESPONSES TO PHQ QUESTIONS 1-9: 7

## 2022-06-29 ASSESSMENT — FIBROSIS 4 INDEX: FIB4 SCORE: 0.74

## 2022-06-29 NOTE — ASSESSMENT & PLAN NOTE
Sees specialist in [place] for eye injections q5-6 weeks; reports the left eye required surgery to drain blood; when they were dong surgery, they found a torn left retina    Reports loss of peripheral vision in the left eye  Has a f/up next week

## 2022-06-29 NOTE — ASSESSMENT & PLAN NOTE
Has been out of work d/t eye issues; no disability has an appt end of July w/social security temp disability  Called housing authority but not much help  Has to get back to work but vision won't allow it

## 2022-06-29 NOTE — PROGRESS NOTES
"Subjective:     CC:   Chief Complaint   Patient presents with   • Establish Care   • Other     Swollen feet   Smoking        HPI:   Real presents today with    Diabetic retinopathy associated with diabetes mellitus due to underlying condition (HCC)  Sees specialist in [place] for eye injections q5-6 weeks; reports the left eye required surgery to drain blood; when they were dong surgery, they found a torn left retina    Reports loss of peripheral vision in the left eye  Has a f/up next week        Stress  Owes close to 4k in rent; is worried about being evicted; has been out of work d/t eye issues; no disability; couldn't get an appt til end of July w/social security temp disability  Called housing authority but not much help-long waiting list  Has to get back to work but vision won't allow it       Income insufficient to meet needs  See stress             ROS per HPI    Objective:     Exam:  BP (!) 166/90 (BP Location: Right arm, Patient Position: Sitting, BP Cuff Size: Large adult)   Pulse 82   Temp 37.1 °C (98.7 °F) (Temporal)   Resp 16   Ht 1.727 m (5' 8\") Comment: pt stated  Wt 120 kg (264 lb) Comment: pt c boots  SpO2 97%   BMI 40.14 kg/m²  Body mass index is 40.14 kg/m².    Physical Exam:  Constitutional: Well-developed and well-nourished male . Not diaphoretic. No distress.   Skin: warm, dry, intact, no evidence of rash or concerning lesions  Head: Atraumatic without lesions.  Eyes: Conjunctivae and extraocular motions are normal. Pupils are equal, round. No scleral icterus.   Ears:  External ears unremarkable.   Neck: Supple, trachea midline.   Cardiovascular: Regular rate and rhythm without murmur.   Pulmonary: Clear to ausculation. Normal effort. No rales, ronchi, or wheezing.  Extremities: No cyanosis, clubbing, erythema, nor edema.   Neurological: Alert and oriented x 3.   Psychiatric:  Behavior, mood, and affect are appropriate.        Assessment & Plan:     47 y.o. male with the following - "     1. Encounter for medical examination to establish care    2. Diabetic retinopathy associated with diabetes mellitus due to underlying condition, macular edema presence unspecified, unspecified laterality, unspecified retinopathy severity (HCC)  Pt signed release for records from previous pcp  Says A1c is down to 5.8 so glipizide was d/c; not taking any other diabetic meds     3. Income insufficient to meet needs  - REFERRAL TO CARE MANAGEMENT ()     5. Primary hypertension  - lisinopril (PRINIVIL) 40 MG tablet; Take 1 Tablet by mouth every day.  Dispense: 90 Tablet; Refill: 3  - amLODIPine (NORVASC) 10 MG Tab; Take 1 Tablet by mouth every day.  Dispense: 90 Tablet; Refill: 3  Pt will measure bp twice daily and return in 2 weeks     6. Hyperlipidemia, unspecified hyperlipidemia type  - atorvastatin (LIPITOR) 40 MG Tab; Take 1 Tablet by mouth every evening.  Dispense: 90 Tablet; Refill: 3         Return in about 2 weeks (around 7/13/2022) for bp check, gen check in.    Please note that this dictation was created using voice recognition software. I have made every reasonable attempt to correct obvious errors, but I expect that there are errors of grammar and possibly content that I did not discover before finalizing the note.

## 2022-06-29 NOTE — LETTER
[facility]  Cheryl M. Demucha, A.P.RGarryN.  [address]   [place] [address]  Fax: [phone]   Authorization for Release/Disclosure of   Protected Health Information   Name: MONTSE GRIFFIN : 1975 SSN: [id number]   Address: [address] [place] [address]  [place] [address] Phone:    [phone] (home)    I authorize the entity listed below to release/disclose the PHI below to:   [facility]/Cheryl M. Demucha, A.P.R.NGarry and Cheryl M. Demucha, A.P.RALEX   Provider or Entity Name:  [facility]    Address   City, State, [facility]   Phone:      Fax:     Reason for request: continuity of care   Information to be released:    [  ] LAST COLONOSCOPY,  including any PATH REPORT and follow-up  [  ] LAST FIT/COLOGUARD RESULT [  ] LAST DEXA  [  ] LAST MAMMOGRAM  [  ] LAST PAP  [  ] LAST LABS [ X] RETINA EXAM REPORT  [  ] IMMUNIZATION RECORDS  [  ] Release all info      [  ] Check here and initial the line next to each item to release ALL health information INCLUDING  _____ Care and treatment for drug and / or alcohol abuse  _____ HIV testing, infection status, or AIDS  _____ Genetic Testing    DATES OF SERVICE OR TIME PERIOD TO BE DISCLOSED: _____________  I understand and acknowledge that:  * This Authorization may be revoked at any time by you in writing, except if your health information has already been used or disclosed.  * Your health information that will be used or disclosed as a result of you signing this authorization could be re-disclosed by the recipient. If this occurs, your re-disclosed health information may no longer be protected by State or Federal laws.  * You may refuse to sign this Authorization. Your refusal will not affect your ability to obtain treatment.  * This Authorization becomes effective upon signing and will  on (date) __________.      If no date is indicated, this Authorization will  one (1) year from the signature date.    Name: Montse Cali  [place]    Signature:   Date:     6/29/2022       PLEASE FAX REQUESTED RECORDS BACK TO: [phone]

## 2022-06-29 NOTE — LETTER
[facility]  Cheryl M. Demucha, A.P.RGarryN.  [address]   [place] [address]  Fax: [phone]   Authorization for Release/Disclosure of   Protected Health Information   Name: MONTSE GRIFFIN : 1975 SSN: [id number]   Address: [address] [place] [address]  [place] [address] Phone:    [phone] (home)    I authorize the entity listed below to release/disclose the PHI below to:   [facility]/Cheryl M. Demucha, A.P.R.NGarry and Cheryl M. Demucha, A.P.LYDIA   Provider or Entity Name:  Dr. Justine Bates    Address   [facility], [place]  [address] [place] [address] Phone:      Fax:  [phone]   Reason for request: continuity of care   Information to be released:    [  ] LAST COLONOSCOPY,  including any PATH REPORT and follow-up  [  ] LAST FIT/COLOGUARD RESULT [  ] LAST DEXA  [  ] LAST MAMMOGRAM  [  ] LAST PAP  [ X] LAST LABS [  ] RETINA EXAM REPORT  [  ] IMMUNIZATION RECORDS  [ X] Release all info      [  ] Check here and initial the line next to each item to release ALL health information INCLUDING  _____ Care and treatment for drug and / or alcohol abuse  _____ HIV testing, infection status, or AIDS  _____ Genetic Testing    DATES OF SERVICE OR TIME PERIOD TO BE DISCLOSED: _____________  I understand and acknowledge that:  * This Authorization may be revoked at any time by you in writing, except if your health information has already been used or disclosed.  * Your health information that will be used or disclosed as a result of you signing this authorization could be re-disclosed by the recipient. If this occurs, your re-disclosed health information may no longer be protected by State or Federal laws.  * You may refuse to sign this Authorization. Your refusal will not affect your ability to obtain treatment.  * This Authorization becomes effective upon signing and will  on (date) __________.      If no date is indicated, this Authorization will  one (1) year from the signature date.    Name:  Real Perry    Signature:   Date:     6/29/2022       PLEASE FAX REQUESTED RECORDS BACK TO: [phone]

## 2022-06-30 ENCOUNTER — PATIENT OUTREACH (OUTPATIENT)
Dept: HEALTH INFORMATION MANAGEMENT | Facility: OTHER | Age: 47
End: 2022-06-30
Payer: COMMERCIAL

## 2022-07-08 ENCOUNTER — PATIENT OUTREACH (OUTPATIENT)
Dept: HEALTH INFORMATION MANAGEMENT | Facility: OTHER | Age: 47
End: 2022-07-08
Payer: COMMERCIAL

## 2022-07-08 NOTE — PROGRESS NOTES
Social Work Assessment  Community Care Management    Synopsis: SW reached out to pt to respond to CCM referral from PCP, assessment completed and needs were identified.  Pt reports that he is experiencing multiple stressors and struggling with health issues.   Pt has Diabetic Retinopathy causing damage to the blood vessels in back of eye, pt now has to get shots every 5-6 weeks in eyes. Loss of peripheral vision because of the condition.  Pt no longer able to drive. He was put on leave from work, loss of income and he is now behind in rent $4000, trying to make payments to catch himself up, but finds himself without any money at times going without food. Fearful at any time they could evict him and he would be forced to stay in his car that is not running.  Pt went back to work but on light duty position. Pt pays $841 month in rent, total income when working full-time $ 2083.      Living Situation/Home Environment: Pt living in alone in one bedroom apartment.   Financial Situation/Sources of Income: Currently working, but had been off work due to eye condition. Pt works for [employer].   Transportation: Unable to drive, car is also broken down.  No reliable transportation, sometimes picked up by coworker to get to work or his friend.   Support System: One friend that lives locally, does provide some transportation to appointments.    Mental Health/Substance Abuse Hx: No mental health or substance abuse history reported.   Ability to Obtain Basic Resources: Pt unable to meet basic needs, behind in rent, at risk of eviction, food and transportation insecurities, income insufficient.   Physical Functioning: Pt has no mobility issues, able to care for self, has vision impairment that prevents him from driving and limits the work he can do.   Patient's Perception of Needs: Transportation services, food resources, assistance with getting rent up to date, may need housing alternatives if evicted. Pt would like  case management services to offer support and connect with community resources.     Plan: SW will provide [facility] Community guide, assist him in applying for case management services with [facility]. Explore rental assistance programs.  Link him with food and transportation resources and provide general support.

## 2022-07-08 NOTE — LETTER
July 19, 2022        Real Perry  [address]  [address]  [place] [address]        Karyn Noble:  I have enclosed some of the resources that we spoke about.  [facility] does have Adult Case Management. You can contact them or even walk in to get services started.      If you have any questions or resource needs, please don't hesitate to call me.         Sincerely,        Estefani Moncada, L.C.S.W.  [phone]    Electronically Signed

## 2022-07-14 ENCOUNTER — OFFICE VISIT (OUTPATIENT)
Dept: MEDICAL GROUP | Facility: PHYSICIAN GROUP | Age: 47
End: 2022-07-14
Payer: COMMERCIAL

## 2022-07-14 VITALS
HEIGHT: 68 IN | DIASTOLIC BLOOD PRESSURE: 90 MMHG | RESPIRATION RATE: 18 BRPM | SYSTOLIC BLOOD PRESSURE: 188 MMHG | HEART RATE: 83 BPM | BODY MASS INDEX: 38.43 KG/M2 | TEMPERATURE: 98.1 F | WEIGHT: 253.6 LBS | OXYGEN SATURATION: 96 %

## 2022-07-14 DIAGNOSIS — E08.319 DIABETIC RETINOPATHY ASSOCIATED WITH DIABETES MELLITUS DUE TO UNDERLYING CONDITION, MACULAR EDEMA PRESENCE UNSPECIFIED, UNSPECIFIED LATERALITY, UNSPECIFIED RETINOPATHY SEVERITY (HCC): ICD-10-CM

## 2022-07-14 DIAGNOSIS — I10 PRIMARY HYPERTENSION: ICD-10-CM

## 2022-07-14 DIAGNOSIS — Z59.6 INCOME INSUFFICIENT TO MEET NEEDS: ICD-10-CM

## 2022-07-14 DIAGNOSIS — F17.200 TOBACCO DEPENDENCE: ICD-10-CM

## 2022-07-14 DIAGNOSIS — F32.A DEPRESSION, UNSPECIFIED DEPRESSION TYPE: ICD-10-CM

## 2022-07-14 PROCEDURE — 99214 OFFICE O/P EST MOD 30 MIN: CPT | Performed by: NURSE PRACTITIONER

## 2022-07-14 RX ORDER — BUPROPION HYDROCHLORIDE 150 MG/1
TABLET, EXTENDED RELEASE ORAL
Qty: 60 TABLET | Refills: 6 | Status: SHIPPED | OUTPATIENT
Start: 2022-07-14

## 2022-07-14 RX ORDER — HYDROCHLOROTHIAZIDE 25 MG/1
TABLET ORAL
Qty: 90 TABLET | Refills: 3 | Status: SHIPPED | OUTPATIENT
Start: 2022-07-14

## 2022-07-14 SDOH — ECONOMIC STABILITY - INCOME SECURITY: LOW INCOME: Z59.6

## 2022-07-14 ASSESSMENT — PATIENT HEALTH QUESTIONNAIRE - PHQ9
5. POOR APPETITE OR OVEREATING: 2 - MORE THAN HALF THE DAYS
SUM OF ALL RESPONSES TO PHQ QUESTIONS 1-9: 12
CLINICAL INTERPRETATION OF PHQ2 SCORE: 2

## 2022-07-14 ASSESSMENT — ANXIETY QUESTIONNAIRES
1. FEELING NERVOUS, ANXIOUS, OR ON EDGE: NOT AT ALL
2. NOT BEING ABLE TO STOP OR CONTROL WORRYING: NOT AT ALL
GAD7 TOTAL SCORE: 3
6. BECOMING EASILY ANNOYED OR IRRITABLE: MORE THAN HALF THE DAYS
5. BEING SO RESTLESS THAT IT IS HARD TO SIT STILL: NOT AT ALL
3. WORRYING TOO MUCH ABOUT DIFFERENT THINGS: NOT AT ALL
4. TROUBLE RELAXING: NOT AT ALL
7. FEELING AFRAID AS IF SOMETHING AWFUL MIGHT HAPPEN: SEVERAL DAYS
IF YOU CHECKED OFF ANY PROBLEMS ON THIS QUESTIONNAIRE, HOW DIFFICULT HAVE THESE PROBLEMS MADE IT FOR YOU TO DO YOUR WORK, TAKE CARE OF THINGS AT HOME, OR GET ALONG WITH OTHER PEOPLE: NOT DIFFICULT AT ALL

## 2022-07-14 ASSESSMENT — FIBROSIS 4 INDEX: FIB4 SCORE: 0.74

## 2022-07-14 NOTE — ASSESSMENT & PLAN NOTE
Returned to work a week ago   Has been standing on his feet all day, so his feet have been hurting somewhat   Hasn't been lifting as heavy weights as he was before; has been helping customers on the floor  Next appt is beg Aug, usually every 4-5 weeks  Reports so far so good

## 2022-07-14 NOTE — PROGRESS NOTES
"Subjective:     CC:   Chief Complaint   Patient presents with   • Follow-Up     Blood pressure       HPI:   eRal presents today with    Income insufficient to meet needs  Patient was referred to  for help with multiple issues including financial   Pt spoke w/SW once; reviewed resources; they haven't spoken since, so he will f/up w/her      Hypertension  At recent appointment 2 weeks ago to establish care patient's blood pressure was elevated   he was advised to measure twice daily at home and bring values to appointment   lisinopril to be taken in a.m. amlodipine to be taken at night    Low of high 120s/70s and high of high 150s/80s      Diabetic retinopathy associated with diabetes mellitus due to underlying condition (HCC)  Returned to work a week ago   Has been standing on his feet all day, so his feet have been hurting somewhat   Hasn't been lifting as heavy weights as he was before; has been helping customers on the floor  Next appt is beg Aug, usually every 4-5 weeks  Reports so far so good       Tobacco dependence  Reports smoking hx off and on x at least 30 yrs; up to a pack a day; avg 1/2-3/4 would like to quit but is concerned about effects of chantix; is open to trying wellbutrin   Has tried NRT but weren't effective  Quit for about 5 months cold turkey he couldn't afford them   Triggers: boredom and stress                ROS per HPI    Objective:     Exam:  BP (!) 168/72 (BP Location: Right arm, Patient Position: Sitting, BP Cuff Size: Large adult)   Pulse 83   Temp 36.7 °C (98.1 °F) (Temporal)   Resp 18   Ht 1.727 m (5' 8\")   Wt 115 kg (253 lb 9.6 oz)   SpO2 96%   BMI 38.56 kg/m²  Body mass index is 38.56 kg/m².      Physical Exam:  Constitutional: Well-developed and well-nourished male. Not diaphoretic. No distress.   Skin: warm, dry, intact, no evidence of rash or concerning lesions  Head: Atraumatic without lesions.  Eyes: Conjunctivae are normal. Pupils are equal, round. No " scleral icterus.   Ears:  External ears unremarkable.   Neck: Supple, trachea midline.   Cardiovascular: Regular rate and rhythm without murmur.   Pulmonary: Clear to ausculation. Normal effort. No rales, ronchi, or wheezing.  Extremities: No cyanosis, clubbing, erythema, nor edema.   Neurological: Alert and oriented x 3.   Psychiatric:  Behavior, mood, and affect are appropriate.          Assessment & Plan:     47 y.o. male with the following -     1. Income insufficient to meet needs  Will contact  directly     2. Primary hypertension  Measure bp twice daily; bring to f/up appt w/cuff  Avoid salt  Manage stress     3. Diabetic retinopathy associated with diabetes mellitus due to underlying condition, macular edema presence unspecified, unspecified laterality, unspecified retinopathy severity (HCC)    4. Tobacco dependence  - buPROPion SR (WELLBUTRIN-SR) 150 MG TABLET SR 12 HR sustained-release tablet; Take 1 tab po every am x 10 days then increase to every 12 hours  Dispense: 60 Tablet; Refill: 6  Will come up w/a quit date     5. Depression, unspecified depression type  - buPROPion SR (WELLBUTRIN-SR) 150 MG TABLET SR 12 HR sustained-release tablet; Take 1 tab po every am x 10 days then increase to every 12 hours  Dispense: 60 Tablet; Refill: 6          Return in about 3 weeks (around 8/4/2022) for depression, gen check in.    Please note that this dictation was created using voice recognition software. I have made every reasonable attempt to correct obvious errors, but I expect that there are errors of grammar and possibly content that I did not discover before finalizing the note.

## 2022-07-14 NOTE — ASSESSMENT & PLAN NOTE
Reports smoking hx off and on x at least 30 yrs; up to a pack a day; avg 1/2-3/4 would like to quit but is concerned about effects of chantix; is open to trying wellbutrin   Has tried NRT but weren't effective  Quit for about 5 months cold turkey he couldn't afford them   Triggers: boredom and stress

## 2022-07-14 NOTE — ASSESSMENT & PLAN NOTE
Patient was referred to  for help with multiple issues including financial   Pt spoke w/SW once; reviewed resources; they haven't spoken since, so he will f/up w/her

## 2022-07-14 NOTE — ASSESSMENT & PLAN NOTE
At recent appointment 2 weeks ago to establish care patient's blood pressure was elevated   he was advised to measure twice daily at home and bring values to appointment   lisinopril to be taken in a.m. amlodipine to be taken at night    Low of high 120s/70s and high of high 150s/80s

## 2022-07-19 ENCOUNTER — PATIENT OUTREACH (OUTPATIENT)
Dept: HEALTH INFORMATION MANAGEMENT | Facility: OTHER | Age: 47
End: 2022-07-19
Payer: COMMERCIAL

## 2022-07-25 NOTE — PROGRESS NOTES
SW attempted to contact pt to complete referral form for [facility].  Left voice message.

## 2022-08-04 ENCOUNTER — PATIENT OUTREACH (OUTPATIENT)
Dept: HEALTH INFORMATION MANAGEMENT | Facility: OTHER | Age: 47
End: 2022-08-04
Payer: COMMERCIAL

## 2022-08-04 NOTE — PROGRESS NOTES
SW attempted call pt for follow up.  ERIN has been unable to make contact.  SW left message with [facility] information should he still be interested in case management services.  ERIN will close CCM case at this time.

## 2022-08-25 ENCOUNTER — TELEPHONE (OUTPATIENT)
Dept: MEDICAL GROUP | Facility: PHYSICIAN GROUP | Age: 47
End: 2022-08-25

## 2022-08-25 ENCOUNTER — OFFICE VISIT (OUTPATIENT)
Dept: MEDICAL GROUP | Facility: PHYSICIAN GROUP | Age: 47
End: 2022-08-25
Payer: COMMERCIAL

## 2022-08-25 VITALS
BODY MASS INDEX: 37.38 KG/M2 | OXYGEN SATURATION: 96 % | SYSTOLIC BLOOD PRESSURE: 158 MMHG | HEART RATE: 85 BPM | TEMPERATURE: 98.1 F | RESPIRATION RATE: 20 BRPM | WEIGHT: 246.6 LBS | HEIGHT: 68 IN | DIASTOLIC BLOOD PRESSURE: 78 MMHG

## 2022-08-25 DIAGNOSIS — E55.9 VITAMIN D DEFICIENCY: ICD-10-CM

## 2022-08-25 DIAGNOSIS — F17.200 TOBACCO DEPENDENCE: ICD-10-CM

## 2022-08-25 DIAGNOSIS — F43.9 STRESS: ICD-10-CM

## 2022-08-25 DIAGNOSIS — R53.83 FATIGUE, UNSPECIFIED TYPE: ICD-10-CM

## 2022-08-25 DIAGNOSIS — Z13.220 SCREENING FOR LIPID DISORDERS: ICD-10-CM

## 2022-08-25 DIAGNOSIS — F33.40 RECURRENT MAJOR DEPRESSIVE DISORDER, IN REMISSION (HCC): ICD-10-CM

## 2022-08-25 DIAGNOSIS — I10 PRIMARY HYPERTENSION: ICD-10-CM

## 2022-08-25 PROCEDURE — 99214 OFFICE O/P EST MOD 30 MIN: CPT | Performed by: NURSE PRACTITIONER

## 2022-08-25 ASSESSMENT — FIBROSIS 4 INDEX: FIB4 SCORE: 0.74

## 2022-08-25 NOTE — TELEPHONE ENCOUNTER
Note from you:   SW attempted call pt for follow up.  SW has been unable to make contact.  SW left message with [facility] information should he still be interested in case management services.  SW will close CCM case at this time.     Pt is here today. Got a 5 day notice to quit or pay rent. Owes 4200. Could he get the info again for the agency listed above. And any other resources?     The 5th day is tomorrow.     I'm off tomorrow, so could you call patient tomorrow if possible?     Thank you so much!     PS we just scanned his legal document; please check mychart  I gave him info on evictions and [place] [employer]

## 2022-08-25 NOTE — ASSESSMENT & PLAN NOTE
Pt's bp is elevated today, he's taking the lisinopril, hctz and amlodipine  Reports returning to work has been stressful  Cont to monitor; may increase the lisinopril to 60 mg prn to maintain <130/90

## 2022-08-25 NOTE — PROGRESS NOTES
"Subjective:     CC:   Chief Complaint   Patient presents with    Follow-Up     F/U on medication, seems to be helping with his mood , been having a stressful week, BP has been better, still has ringing in ears        HPI:   Real presents today with    Recurrent major depressive disorder, in remission (HCC)        Hypertension  Pt's bp is elevated today, he's taking the lisinopril, hctz and amlodipine  Reports returning to work has been stressful  Cont to monitor; may increase the lisinopril to 60 mg prn to maintain <130/90    Tobacco dependence  Pt started on wellbutrin     Vitamin D deficiency disease  Will do in near future    Stress  Pt has many stressors that were affecting him-out of work for health issues, not enough income to meet his needs  He was referred to a  and has since returned to work  I started him on wellbutrin for smoking cessation; he reports it's helping   Work is ok but the biggest stressor is the 5 day notice of eviction he received Monday; he's 4200 behind in rent due to health issues  I scanned the eviction notice he received and sent a message to the  he'd talked with before     I gave him information on [place] [employer] so that he could find out about fighting the evictio   asked him to follow-up with              ROS per HPI    Objective:     Exam:  BP (!) 158/78 (BP Location: Right arm, Patient Position: Sitting, BP Cuff Size: Adult long)   Pulse 85   Temp 36.7 °C (98.1 °F) (Temporal)   Resp 20   Ht 1.727 m (5' 8\")   Wt 112 kg (246 lb 9.6 oz)   SpO2 96%   BMI 37.50 kg/m²  Body mass index is 37.5 kg/m².    Physical Exam:  Constitutional: Well-developed and well-nourished male  Not diaphoretic. No distress.   Skin: warm, dry, intact, no evidence of rash or concerning lesions  Head: Atraumatic without lesions.  Eyes: Conjunctivae and extraocular motions are normal. Pupils are equal, round. No scleral icterus.   Ears:  External ears " unremarkable.   Neck: Supple, trachea midline. No thyromegaly present. No cervical or supraclavicular lymphadenopathy.  Cardiovascular: Regular rate and rhythm without murmur.   Pulmonary: Clear to ausculation. Normal effort. No rales, ronchi, or wheezing.  Extremities: No cyanosis, clubbing, erythema, nor edema.   Neurological: Alert and oriented x 3.   Psychiatric:  Behavior, mood, and affect are appropriate.        Assessment & Plan:     47 y.o. male with the following -   Problem List Items Addressed This Visit       Hypertension     Pt's bp is elevated today, he's taking the lisinopril, hctz and amlodipine  Reports returning to work has been stressful  Cont to monitor; may increase the lisinopril to 60 mg prn to maintain <130/90         Tobacco dependence     Pt started on wellbutrin          Vitamin D deficiency disease     Will do in near future         Relevant Orders    VITAMIN D,25 HYDROXY (DEFICIENCY)    Stress     Pt has many stressors that were affecting him-out of work for health issues, not enough income to meet his needs  He was referred to a  and has since returned to work  I started him on wellbutrin for smoking cessation; he reports it's helping   Work is ok but the biggest stressor is the 5 day notice of eviction he received Monday; he's 4200 behind in rent due to health issues  I scanned the eviction notice he received and sent a message to the  he'd talked with before     I gave him information on [place] [employer] services so that he could find out about fighting the evictio   asked him to follow-up with                           Return in about 4 weeks (around 9/22/2022) for lab results.    Please note that this dictation was created using voice recognition software. I have made every reasonable attempt to correct obvious errors, but I expect that there are errors of grammar and possibly content that I did not discover before finalizing the note.

## 2022-08-26 NOTE — ASSESSMENT & PLAN NOTE
Pt has many stressors that were affecting him-out of work for health issues, not enough income to meet his needs  He was referred to a  and has since returned to work  I started him on wellbutrin for smoking cessation; he reports it's helping   Work is ok but the biggest stressor is the 5 day notice of eviction he received Monday; he's 4200 behind in rent due to health issues  I scanned the eviction notice he received and sent a message to the  he'd talked with before     I gave him information on [place] [employer] so that he could find out about fighting the evictio   asked him to follow-up with

## 2022-09-10 ENCOUNTER — HOSPITAL ENCOUNTER (OUTPATIENT)
Dept: LAB | Facility: MEDICAL CENTER | Age: 47
End: 2022-09-10
Attending: NURSE PRACTITIONER
Payer: COMMERCIAL

## 2022-09-10 DIAGNOSIS — E55.9 VITAMIN D DEFICIENCY: ICD-10-CM

## 2022-09-10 DIAGNOSIS — R53.83 FATIGUE, UNSPECIFIED TYPE: ICD-10-CM

## 2022-09-10 DIAGNOSIS — Z13.220 SCREENING FOR LIPID DISORDERS: ICD-10-CM

## 2022-09-10 LAB
25(OH)D3 SERPL-MCNC: 11 NG/ML (ref 30–100)
CHOLEST SERPL-MCNC: 126 MG/DL (ref 100–199)
FASTING STATUS PATIENT QL REPORTED: NORMAL
HDLC SERPL-MCNC: 45 MG/DL
LDLC SERPL CALC-MCNC: 48 MG/DL
TRIGL SERPL-MCNC: 165 MG/DL (ref 0–149)
TSH SERPL DL<=0.005 MIU/L-ACNC: 2.87 UIU/ML (ref 0.38–5.33)

## 2022-09-10 PROCEDURE — 82306 VITAMIN D 25 HYDROXY: CPT

## 2022-09-10 PROCEDURE — 80061 LIPID PANEL: CPT

## 2022-09-10 PROCEDURE — 36415 COLL VENOUS BLD VENIPUNCTURE: CPT

## 2022-09-10 PROCEDURE — 84443 ASSAY THYROID STIM HORMONE: CPT

## 2022-09-20 ENCOUNTER — OFFICE VISIT (OUTPATIENT)
Dept: MEDICAL GROUP | Facility: PHYSICIAN GROUP | Age: 47
End: 2022-09-20
Payer: COMMERCIAL

## 2022-09-20 VITALS
HEART RATE: 88 BPM | DIASTOLIC BLOOD PRESSURE: 80 MMHG | OXYGEN SATURATION: 97 % | HEIGHT: 68 IN | BODY MASS INDEX: 36.98 KG/M2 | RESPIRATION RATE: 14 BRPM | WEIGHT: 244 LBS | SYSTOLIC BLOOD PRESSURE: 160 MMHG | TEMPERATURE: 97.9 F

## 2022-09-20 DIAGNOSIS — E55.9 VITAMIN D DEFICIENCY: ICD-10-CM

## 2022-09-20 DIAGNOSIS — N17.9 AKI (ACUTE KIDNEY INJURY) (HCC): ICD-10-CM

## 2022-09-20 DIAGNOSIS — H61.23 CERUMEN DEBRIS ON TYMPANIC MEMBRANE OF BOTH EARS: ICD-10-CM

## 2022-09-20 DIAGNOSIS — N18.4 STAGE 4 CHRONIC KIDNEY DISEASE (HCC): ICD-10-CM

## 2022-09-20 DIAGNOSIS — E08.319: ICD-10-CM

## 2022-09-20 DIAGNOSIS — Z59.6 INCOME INSUFFICIENT TO MEET NEEDS: ICD-10-CM

## 2022-09-20 DIAGNOSIS — E11.65 UNCONTROLLED TYPE 2 DIABETES MELLITUS WITH HYPERGLYCEMIA (HCC): ICD-10-CM

## 2022-09-20 LAB
HBA1C MFR BLD: 5.5 % (ref 0–5.6)
INT CON NEG: NORMAL
INT CON POS: NORMAL

## 2022-09-20 PROCEDURE — 99214 OFFICE O/P EST MOD 30 MIN: CPT | Performed by: NURSE PRACTITIONER

## 2022-09-20 PROCEDURE — 83036 HEMOGLOBIN GLYCOSYLATED A1C: CPT | Performed by: NURSE PRACTITIONER

## 2022-09-20 SDOH — ECONOMIC STABILITY - INCOME SECURITY: LOW INCOME: Z59.6

## 2022-09-20 ASSESSMENT — FIBROSIS 4 INDEX: FIB4 SCORE: 0.74

## 2022-09-21 NOTE — ASSESSMENT & PLAN NOTE
Most recent GFR was 17, BUN 49, creatinine 4 stat ultrasound ordered for patient and referral to nephrology advised patient to not take NSAIDs only Tylenol if he has pain avoid high calcium and phosphorus foods

## 2022-09-21 NOTE — ASSESSMENT & PLAN NOTE
Patient was able to talk with the resource center behind the senior center and they took care of his rent for him  doing much better in this regard

## 2022-09-21 NOTE — PROGRESS NOTES
"Subjective:     CC:   Chief Complaint   Patient presents with    Follow-Up     Labs        HPI:   Real presents today for f/up on labs     Income insufficient to meet needs  Patient was able to talk with the resource center behind the senior center and they took care of his rent for him  doing much better in this regard    Diabetes mellitus type 2, uncontrolled (HCC)  Patient's diabetes was uncontrolled at one point; A1c was  10  most recently, in office today POCT was 5.5    JOY (acute kidney injury) ([facility])  See note for CKD    Stage 4 chronic kidney disease ([facility])  Most recent GFR was 17, BUN 49, creatinine 4 stat ultrasound ordered for patient and referral to nephrology advised patient to not take NSAIDs only Tylenol if he has pain avoid high calcium and phosphorus foods    Vitamin D deficiency disease  Vitamin D is 11, advising patient to take 4000 IUs/day with a goal to increase vit d level to 50    Cerumen debris on tympanic membrane of both ears  P/t c/o ringing in ears  Ear lavage bilat; pt tolerated well               ROS per HPI    Objective:     Exam:  BP (!) 160/80 (BP Location: Right arm, Patient Position: Sitting, BP Cuff Size: Adult long)   Pulse 88   Temp 36.6 °C (97.9 °F) (Temporal)   Resp 14   Ht 1.715 m (5' 7.5\")   Wt 111 kg (244 lb) Comment: c boots  SpO2 97%   BMI 37.65 kg/m²  Body mass index is 37.65 kg/m².    Physical Exam:  Constitutional: Well-developed and well-nourished male Not diaphoretic. No distress.   Skin: warm, dry, intact, no evidence of rash or concerning lesions  Head: Atraumatic without lesions.  Eyes: Conjunctivae are normal. Pupils are equal, round. No scleral icterus.   Ears:  External ears unremarkable. Right TM obscured by cerumen; mod cerumen on left TM  Neck: Supple, trachea midline. No thyromegaly present. No cervical or supraclavicular lymphadenopathy.  Cardiovascular: Regular rate and rhythm without murmur.   Pulmonary: Clear to ausculation. Normal effort. No " rales, ronchi, or wheezing.  Extremities: No cyanosis, clubbing, erythema, nor edema.   Neurological: Alert and oriented x 3.   Psychiatric:  Behavior, mood, and affect are appropriate.        Assessment & Plan:     47 y.o. male with the following -     Type 2 DM: well controlled-A1c was 5.5 in office; repeat A1c in 3 mos     2. Stage 4 chronic kidney disease (HCC)  - Referral to Nephrology  - [facility]; Future    3. Income insufficient to meet needs  Resolved     4. Vitamin D deficiency disease  4000 iu/day         Return in about 4 weeks (around 10/18/2022). Will call w/u/s results  [employer] will no longer be contracted w/[facility] as of 11/1/2022, so pt will be looking for another provider; sent referral for nephrology w/note to send him outside [facility] d/t insurance     Please note that this dictation was created using voice recognition software. I have made every reasonable attempt to correct obvious errors, but I expect that there are errors of grammar and possibly content that I did not discover before finalizing the note.

## 2022-09-21 NOTE — ASSESSMENT & PLAN NOTE
Patient's diabetes was uncontrolled at one point; A1c was  10  most recently, in office today POCT was 5.5

## 2022-09-29 ENCOUNTER — HOSPITAL ENCOUNTER (OUTPATIENT)
Dept: RADIOLOGY | Facility: MEDICAL CENTER | Age: 47
End: 2022-09-29
Attending: NURSE PRACTITIONER
Payer: COMMERCIAL

## 2022-09-29 DIAGNOSIS — N18.4 STAGE 4 CHRONIC KIDNEY DISEASE (HCC): ICD-10-CM

## 2022-09-29 PROCEDURE — 76775 US EXAM ABDO BACK WALL LIM: CPT

## 2022-12-11 ENCOUNTER — OFFICE VISIT (OUTPATIENT)
Dept: URGENT CARE | Facility: PHYSICIAN GROUP | Age: 47
End: 2022-12-11
Payer: COMMERCIAL

## 2022-12-11 VITALS
OXYGEN SATURATION: 97 % | WEIGHT: 233 LBS | DIASTOLIC BLOOD PRESSURE: 78 MMHG | BODY MASS INDEX: 35.31 KG/M2 | RESPIRATION RATE: 16 BRPM | TEMPERATURE: 98.5 F | SYSTOLIC BLOOD PRESSURE: 142 MMHG | HEIGHT: 68 IN | HEART RATE: 100 BPM

## 2022-12-11 DIAGNOSIS — J34.89 NASAL VESTIBULITIS: ICD-10-CM

## 2022-12-11 PROCEDURE — 99213 OFFICE O/P EST LOW 20 MIN: CPT | Performed by: FAMILY MEDICINE

## 2022-12-11 RX ORDER — DOXYCYCLINE HYCLATE 100 MG
100 TABLET ORAL 2 TIMES DAILY
Qty: 20 TABLET | Refills: 0 | Status: SHIPPED | OUTPATIENT
Start: 2022-12-11 | End: 2022-12-21

## 2022-12-11 ASSESSMENT — FIBROSIS 4 INDEX: FIB4 SCORE: 0.74

## 2022-12-11 NOTE — PROGRESS NOTES
Chief Complaint:    Chief Complaint   Patient presents with    Sinus Problem     Sore spot on nose pt states, spread, began Monday. Swelling        History of Present Illness:    On 12/5/22, felt pain at opening of right nare. Since then, has developed redness and swelling in this area. Also having some pain and swelling in face emanating from this area.      Past Medical History:    Past Medical History:   Diagnosis Date    Diabetes     Diet and oral medications    High cholesterol     Hypertension     Pneumonia 10/15/2021    Psychiatric problem     Has low emotional periods    Renal disorder     High strain     Past Surgical History:    Past Surgical History:   Procedure Laterality Date    VITRECTOMY POSTERIOR Left 1/25/2022    Procedure: VITRECTOMY, POSTERIOR PORTION - MEMBRANE PEEL ENDO LASER, GAS ([id number]);  Surgeon: Joanne Angel M.D.;  Location: SURGERY SAME DAY [facility];  Service: Ophthalmology    OPEN REDUCTION Right 07/1991    Foot     Social History:    Social History     Socioeconomic History    Marital status: Single     Spouse name: Not on file    Number of children: Not on file    Years of education: Not on file    Highest education level: Not on file   Occupational History    Not on file   Tobacco Use    Smoking status: Every Day     Packs/day: 0.50     Years: 22.00     Pack years: 11.00     Types: Cigarettes    Smokeless tobacco: Never   Vaping Use    Vaping Use: Never used   Substance and Sexual Activity    Alcohol use: Not Currently    Drug use: No    Sexual activity: Yes     Partners: Female   Other Topics Concern    Not on file   Social History Narrative    Not on file     Social Determinants of Health     Financial Resource Strain: Not on file   Food Insecurity: Not on file   Transportation Needs: Not on file   Physical Activity: Not on file   Stress: Not on file   Social Connections: Not on file   Intimate Partner Violence: Not on file   Housing Stability: Not on file     Family  "History:    Family History   Problem Relation Age of Onset    Stroke Father     Cancer Father         prostate    Diabetes Mother     Diabetes Maternal Grandmother     Cancer Paternal Grandfather         lung and prostate     Medications:    Current Outpatient Medications on File Prior to Visit   Medication Sig Dispense Refill    buPROPion SR (WELLBUTRIN-SR) 150 MG TABLET SR 12 HR sustained-release tablet Take 1 tab po every am x 10 days then increase to every 12 hours 60 Tablet 6    hydroCHLOROthiazide (HYDRODIURIL) 25 MG Tab Take 1/2 tab PO once every am; increase to full tab if needed to maintain bp of <130/90 90 Tablet 3    lisinopril (PRINIVIL) 40 MG tablet Take 1 Tablet by mouth every day. 90 Tablet 3    atorvastatin (LIPITOR) 40 MG Tab Take 1 Tablet by mouth every evening. 90 Tablet 3    amLODIPine (NORVASC) 10 MG Tab Take 1 Tablet by mouth every day. 90 Tablet 3     No current facility-administered medications on file prior to visit.     Allergies:    No Known Allergies      Vitals:    Vitals:    12/11/22 1338   BP: (!) 142/78   Pulse: 100   Resp: 16   Temp: 36.9 °C (98.5 °F)   TempSrc: Temporal   SpO2: 97%   Weight: 106 kg (233 lb)   Height: 1.715 m (5' 7.5\")       Physical Exam:    Constitutional: Vital signs reviewed. Appears well-developed and well-nourished. No acute distress.   Eyes: Sclera white, conjunctivae clear.   ENT: At opening of right nare there is erythema, swelling, and tenderness to palpation of the skin. No obvious pus or fluctuance seen or felt. External ears normal. Hearing normal. Nasal mucosa pink. Lips are normal.   Neck: Neck supple.   Cardiovascular: Regular rate and rhythm. No murmur.  Pulmonary/Chest: Respirations non-labored. Clear to auscultation bilaterally.  Musculoskeletal: Normal gait. No muscular atrophy or weakness.  Neurological: Alert and oriented to person, place, and time. Muscle tone normal. Coordination normal.   Psychiatric: Normal mood and affect. Behavior is " normal. Judgment and thought content normal.       Medical Decision Makin. Nasal vestibulitis  - doxycycline (VIBRAMYCIN) 100 MG Tab; Take 1 Tablet by mouth 2 times a day for 10 days.  Dispense: 20 Tablet; Refill: 0       Discussed with him DDX, management options, and risks, benefits, and alternatives to treatment plan agreed upon.    On 22, felt pain at opening of right nare. Since then, has developed redness and swelling in this area. Also having some pain and swelling in face emanating from this area.    At opening of right nare there is erythema, swelling, and tenderness to palpation of the skin. No obvious pus or fluctuance seen or felt.    May use OTC Tylenol as needed for pain.     Agreeable to medication prescribed.    Discussed expected course of duration, time for improvement, and to seek follow-up in Emergency Room, urgent care, or with PCP if getting worse at any time or not improving within expected time frame.

## 2023-04-15 ENCOUNTER — HOSPITAL ENCOUNTER (OUTPATIENT)
Facility: MEDICAL CENTER | Age: 48
End: 2023-04-15
Attending: NURSE PRACTITIONER
Payer: COMMERCIAL

## 2023-04-15 ENCOUNTER — OFFICE VISIT (OUTPATIENT)
Dept: URGENT CARE | Facility: PHYSICIAN GROUP | Age: 48
End: 2023-04-15
Payer: COMMERCIAL

## 2023-04-15 VITALS
DIASTOLIC BLOOD PRESSURE: 100 MMHG | OXYGEN SATURATION: 100 % | BODY MASS INDEX: 37.13 KG/M2 | WEIGHT: 245 LBS | HEIGHT: 68 IN | RESPIRATION RATE: 18 BRPM | HEART RATE: 98 BPM | TEMPERATURE: 98.8 F | SYSTOLIC BLOOD PRESSURE: 166 MMHG

## 2023-04-15 DIAGNOSIS — I10 PRIMARY HYPERTENSION: ICD-10-CM

## 2023-04-15 DIAGNOSIS — R21 RASH OF UNKNOWN ETIOLOGY: ICD-10-CM

## 2023-04-15 DIAGNOSIS — E08.319 DIABETIC RETINOPATHY ASSOCIATED WITH DIABETES MELLITUS DUE TO UNDERLYING CONDITION, MACULAR EDEMA PRESENCE UNSPECIFIED, UNSPECIFIED LATERALITY, UNSPECIFIED RETINOPATHY SEVERITY (HCC): ICD-10-CM

## 2023-04-15 DIAGNOSIS — L03.114 CELLULITIS OF LEFT UPPER EXTREMITY: ICD-10-CM

## 2023-04-15 DIAGNOSIS — E78.5 HYPERLIPIDEMIA, UNSPECIFIED HYPERLIPIDEMIA TYPE: ICD-10-CM

## 2023-04-15 PROCEDURE — 99214 OFFICE O/P EST MOD 30 MIN: CPT | Performed by: NURSE PRACTITIONER

## 2023-04-15 PROCEDURE — 87070 CULTURE OTHR SPECIMN AEROBIC: CPT

## 2023-04-15 PROCEDURE — 87077 CULTURE AEROBIC IDENTIFY: CPT

## 2023-04-15 PROCEDURE — 87186 SC STD MICRODIL/AGAR DIL: CPT

## 2023-04-15 PROCEDURE — 87205 SMEAR GRAM STAIN: CPT

## 2023-04-15 RX ORDER — CEPHALEXIN 500 MG/1
500 CAPSULE ORAL 3 TIMES DAILY
Qty: 15 CAPSULE | Refills: 0 | Status: SHIPPED | OUTPATIENT
Start: 2023-04-15 | End: 2023-04-20

## 2023-04-15 RX ORDER — AMLODIPINE BESYLATE 10 MG/1
10 TABLET ORAL DAILY
Qty: 90 TABLET | Refills: 3 | Status: SHIPPED | OUTPATIENT
Start: 2023-04-15

## 2023-04-15 RX ORDER — GLIPIZIDE 5 MG/1
5 TABLET, FILM COATED, EXTENDED RELEASE ORAL DAILY
Qty: 30 TABLET | Refills: 0 | Status: SHIPPED | OUTPATIENT
Start: 2023-04-15 | End: 2023-05-15

## 2023-04-15 RX ORDER — SULFAMETHOXAZOLE AND TRIMETHOPRIM 800; 160 MG/1; MG/1
1 TABLET ORAL 2 TIMES DAILY
Qty: 14 TABLET | Refills: 0 | Status: SHIPPED | OUTPATIENT
Start: 2023-04-15 | End: 2023-04-22

## 2023-04-15 RX ORDER — LISINOPRIL 40 MG/1
40 TABLET ORAL DAILY
Qty: 90 TABLET | Refills: 3 | Status: SHIPPED | OUTPATIENT
Start: 2023-04-15

## 2023-04-15 ASSESSMENT — ENCOUNTER SYMPTOMS
VOMITING: 0
CHILLS: 0
FEVER: 0
NAUSEA: 0

## 2023-04-15 ASSESSMENT — FIBROSIS 4 INDEX: FIB4 SCORE: 0.74

## 2023-04-16 NOTE — PROGRESS NOTES
Subjective:     Real Perry is a 47 y.o. male who presents for Insect Bite ((L) forearm X 2 wk, pt states he noticed it and thought it was just a ingrown hair. After a few days pt states it started to get a little bigger and turn red. Pt states now it feels warm to the touch.)      HPI  Pt presents for evaluation of a new problem. Real is a very pleasant 47-year-old male presents urgent care today with complaints of a possible spider bite or ingrown hair to his left forearm.  He first noticed this approximately 2 to 3 weeks ago.  He does endorse itching as well as pain.  His symptoms have progressively worsened and he is now developing severe swelling of the left forearm and further itching and purulent lesions over other areas of his arm as well as his right arm.  He denies any fevers or chills.  He does complain of redness as well as pain with light pressure.   He notes that he is also out of his blood pressure medication as well as his hyperglycemic medication glipizide and is requesting a refill.  His PCP is currently out on medical leave.  Review of Systems   Constitutional:  Negative for chills and fever.   Gastrointestinal:  Negative for nausea and vomiting.     PMH:   Past Medical History:   Diagnosis Date    Diabetes     Diet and oral medications    High cholesterol     Hypertension     Pneumonia 10/15/2021    Psychiatric problem     Has low emotional periods    Renal disorder     High strain     ALLERGIES: No Known Allergies  SURGHX:   Past Surgical History:   Procedure Laterality Date    VITRECTOMY POSTERIOR Left 1/25/2022    Procedure: VITRECTOMY, POSTERIOR PORTION - MEMBRANE PEEL ENDO LASER, GAS (C3F8);  Surgeon: Joanne Angel M.D.;  Location: SURGERY SAME DAY [facility];  Service: Ophthalmology    OPEN REDUCTION Right 07/1991    Foot     SOCHX:   Social History     Socioeconomic History    Marital status: Single   Tobacco Use    Smoking status: Every Day     Packs/day: 0.50     Years:  "22.00     Pack years: 11.00     Types: Cigarettes    Smokeless tobacco: Never   Vaping Use    Vaping Use: Never used   Substance and Sexual Activity    Alcohol use: Not Currently    Drug use: No    Sexual activity: Yes     Partners: Female     FH:   Family History   Problem Relation Age of Onset    Stroke Father     Cancer Father         prostate    Diabetes Mother     Diabetes Maternal Grandmother     Cancer Paternal Grandfather         lung and prostate         Objective:   BP (!) 166/100   Pulse 98   Temp 37.1 °C (98.8 °F) (Temporal)   Resp 18   Ht 1.727 m (5' 8\")   Wt 111 kg (245 lb)   SpO2 100%   BMI 37.25 kg/m²     Physical Exam  Vitals and nursing note reviewed.   Constitutional:       General: He is not in acute distress.     Appearance: Normal appearance. He is normal weight. He is not ill-appearing or toxic-appearing.   HENT:      Head: Normocephalic.      Right Ear: External ear normal.      Left Ear: External ear normal.      Nose: Nose normal.      Mouth/Throat:      Mouth: Mucous membranes are moist.   Eyes:      General:         Right eye: No discharge.         Left eye: No discharge.      Extraocular Movements: Extraocular movements intact.      Conjunctiva/sclera: Conjunctivae normal.      Pupils: Pupils are equal, round, and reactive to light.   Pulmonary:      Effort: Pulmonary effort is normal.      Breath sounds: Normal breath sounds.   Abdominal:      General: Abdomen is flat.   Musculoskeletal:         General: Normal range of motion.      Cervical back: Normal range of motion and neck supple. No rigidity.   Lymphadenopathy:      Cervical: No cervical adenopathy.   Skin:     General: Skin is warm and dry.      Comments: There are multiple erythemic scabbed over lesions present to his left dorsal and volar aspect of his forearm.  Positive for induration and areas of lesion.  Negative for fluctuance.  There is mild pain with palpation.  Right lower arm with similar lesions. "   Neurological:      General: No focal deficit present.      Mental Status: He is alert and oriented to person, place, and time. Mental status is at baseline.   Psychiatric:         Mood and Affect: Mood normal.         Behavior: Behavior normal.         Judgment: Judgment normal.       Assessment/Plan:   Assessment    1. Rash of unknown etiology  CULTURE WOUND W/ GRAM STAIN      2. Cellulitis of left upper extremity  cephALEXin (KEFLEX) 500 MG Cap    sulfamethoxazole-trimethoprim (BACTRIM DS) 800-160 MG tablet    CULTURE WOUND W/ GRAM STAIN      3. Hyperlipidemia, unspecified hyperlipidemia type  glipiZIDE SR (GLUCOTROL) 5 MG TABLET SR 24 HR      4. Primary hypertension  amLODIPine (NORVASC) 10 MG Tab    lisinopril (PRINIVIL) 40 MG tablet        Amlodipine and glipizide refilled per request.  Patient to follow-up with PCP for further refills.  Lesion of left forearm was cultured for evaluation of suspected staph infection.  He was empirically started on cephalexin and Bactrim for broad-spectrum coverage.  I will notify him of results.  Patient to follow-up in urgent care or ER for worsening or persistent symptoms.  He is in agreement with plan of care today.  AVS handout given and reviewed with patient. Pt educated on red flags and when to seek treatment back in ER or UC.

## 2023-04-17 DIAGNOSIS — L03.114 CELLULITIS OF LEFT UPPER EXTREMITY: ICD-10-CM

## 2023-04-17 DIAGNOSIS — R21 RASH OF UNKNOWN ETIOLOGY: ICD-10-CM

## 2023-04-18 LAB
GRAM STN SPEC: NORMAL
SIGNIFICANT IND 70042: NORMAL
SITE SITE: NORMAL
SOURCE SOURCE: NORMAL

## 2023-04-20 LAB
BACTERIA WND AEROBE CULT: ABNORMAL
BACTERIA WND AEROBE CULT: ABNORMAL
GRAM STN SPEC: ABNORMAL
SIGNIFICANT IND 70042: ABNORMAL
SITE SITE: ABNORMAL
SOURCE SOURCE: ABNORMAL

## 2023-04-24 DIAGNOSIS — Z22.322 MRSA (METHICILLIN RESISTANT STAPH AUREUS) CULTURE POSITIVE: ICD-10-CM

## 2023-04-24 RX ORDER — SULFAMETHOXAZOLE AND TRIMETHOPRIM 800; 160 MG/1; MG/1
1 TABLET ORAL 2 TIMES DAILY
Qty: 10 TABLET | Refills: 0 | Status: SHIPPED | OUTPATIENT
Start: 2023-04-24 | End: 2023-04-29

## 2023-06-07 ENCOUNTER — OFFICE VISIT (OUTPATIENT)
Dept: URGENT CARE | Facility: PHYSICIAN GROUP | Age: 48
End: 2023-06-07
Payer: COMMERCIAL

## 2023-06-07 VITALS
OXYGEN SATURATION: 99 % | HEIGHT: 68 IN | DIASTOLIC BLOOD PRESSURE: 88 MMHG | SYSTOLIC BLOOD PRESSURE: 150 MMHG | HEART RATE: 84 BPM | BODY MASS INDEX: 36.01 KG/M2 | WEIGHT: 237.6 LBS | RESPIRATION RATE: 18 BRPM | TEMPERATURE: 97.2 F

## 2023-06-07 DIAGNOSIS — L08.9 SKIN INFECTION: ICD-10-CM

## 2023-06-07 DIAGNOSIS — Z76.0 MEDICATION REFILL: ICD-10-CM

## 2023-06-07 DIAGNOSIS — Z86.14 HISTORY OF MRSA INFECTION: ICD-10-CM

## 2023-06-07 PROBLEM — H61.23 CERUMEN DEBRIS ON TYMPANIC MEMBRANE OF BOTH EARS: Status: RESOLVED | Noted: 2022-09-20 | Resolved: 2023-06-07

## 2023-06-07 PROBLEM — Z00.00 ENCOUNTER FOR MEDICAL EXAMINATION TO ESTABLISH CARE: Status: RESOLVED | Noted: 2022-06-29 | Resolved: 2023-06-07

## 2023-06-07 PROCEDURE — 99214 OFFICE O/P EST MOD 30 MIN: CPT

## 2023-06-07 PROCEDURE — 3077F SYST BP >= 140 MM HG: CPT

## 2023-06-07 PROCEDURE — 3079F DIAST BP 80-89 MM HG: CPT

## 2023-06-07 RX ORDER — GLIPIZIDE 5 MG/1
5 TABLET ORAL
Qty: 30 TABLET | Refills: 0 | Status: SHIPPED | OUTPATIENT
Start: 2023-06-07

## 2023-06-07 RX ORDER — SULFAMETHOXAZOLE AND TRIMETHOPRIM 800; 160 MG/1; MG/1
1 TABLET ORAL 2 TIMES DAILY
Qty: 14 TABLET | Refills: 0 | Status: SHIPPED | OUTPATIENT
Start: 2023-06-07 | End: 2023-06-14

## 2023-06-07 ASSESSMENT — ENCOUNTER SYMPTOMS
MYALGIAS: 0
FEVER: 0
SHORTNESS OF BREATH: 0

## 2023-06-07 ASSESSMENT — FIBROSIS 4 INDEX: FIB4 SCORE: 0.75

## 2023-06-07 NOTE — PROGRESS NOTES
Subjective:     CHIEF COMPLAINT  Chief Complaint   Patient presents with    Insect Bite     Poss insect bite (L) calf. Pt states he noticed a few days ago but it was very small. Pt states today it's bigger and red       HPI  Real Perry is a very pleasant 48 y.o. male who presents with a small circular area of redness and swelling on the medial surface of his left calf.  He reports that he may have been bit by a bug, although he did not see a bug.  He was seen in the urgent care on 4/15/2023 for a similar lesion on his arm which was cultured and positive for MRSA.  He was treated with Bactrim with good results.  He reports that the lesion on his leg has been slightly itchy, but over the past 4 days has become tender.  He denies any fevers, body aches, or chills.  He otherwise feels well.  Additionally, he is requesting a refill for his glipizide 5 mg.  He has a appointment with his primary care scheduled for the end of the month.    REVIEW OF SYSTEMS  Review of Systems   Constitutional:  Negative for fever and malaise/fatigue.   Respiratory:  Negative for shortness of breath.    Cardiovascular:  Negative for chest pain.   Musculoskeletal:  Negative for myalgias.   Skin:  Positive for itching and rash.       PAST MEDICAL HISTORY  Patient Active Problem List    Diagnosis Date Noted    Stage 4 chronic kidney disease (Former[facility]) 09/20/2022    Diabetic retinopathy associated with diabetes mellitus due to underlying condition ([facility]) 06/29/2022    Stress 06/29/2022    Income insufficient to meet needs 06/29/2022    Cardiomyopathy due to hypertension, without heart failure ([facility]) 06/27/2019    Class 3 severe obesity due to excess calories with body mass index (BMI) of 40.0 to 44.9 in adult (Formerly [facility]) 06/27/2019    Kidney stone 06/27/2019    JOY (acute kidney injury) ([facility]) 06/25/2019    Hyperlipidemia 02/05/2014    Hypertriglyceridemia 02/05/2014    Vitamin D deficiency disease 02/05/2014    Hypertension 01/15/2014    Diabetes  "mellitus type 2, uncontrolled 01/15/2014    Tobacco dependence 01/15/2014       SURGICAL HISTORY   has a past surgical history that includes open reduction (Right, 07/1991) and vitrectomy posterior (Left, 1/25/2022).    ALLERGIES  No Known Allergies    CURRENT MEDICATIONS  Home Medications       Reviewed by Meghna Grijalva P.A.-C. (Physician Assistant) on 06/07/23 at 1043  Med List Status: <None>     Medication Last Dose Status   amLODIPine (NORVASC) 10 MG Tab Taking Active   atorvastatin (LIPITOR) 40 MG Tab Taking Active   buPROPion SR (WELLBUTRIN-SR) 150 MG TABLET SR 12 HR sustained-release tablet  Active   hydroCHLOROthiazide (HYDRODIURIL) 25 MG Tab  Active   lisinopril (PRINIVIL) 40 MG tablet Taking Active                    SOCIAL HISTORY  Social History     Tobacco Use    Smoking status: Every Day     Packs/day: 0.50     Years: 22.00     Pack years: 11.00     Types: Cigarettes    Smokeless tobacco: Never   Vaping Use    Vaping Use: Never used   Substance and Sexual Activity    Alcohol use: Not Currently    Drug use: No    Sexual activity: Yes     Partners: Female       FAMILY HISTORY  Family History   Problem Relation Age of Onset    Stroke Father     Cancer Father         prostate    Diabetes Mother     Diabetes Maternal Grandmother     Cancer Paternal Grandfather         lung and prostate          Objective:     VITAL SIGNS: BP (!) 150/88   Pulse 84   Temp 36.2 °C (97.2 °F) (Temporal)   Resp 18   Ht 1.727 m (5' 8\")   Wt 108 kg (237 lb 9.6 oz)   SpO2 99%   BMI 36.13 kg/m²     PHYSICAL EXAM  Physical Exam  Vitals reviewed.   Constitutional:       General: He is not in acute distress.     Appearance: Normal appearance. He is not ill-appearing or toxic-appearing.   HENT:      Head: Normocephalic and atraumatic.      Nose: Nose normal.      Mouth/Throat:      Mouth: Mucous membranes are moist.   Eyes:      Conjunctiva/sclera: Conjunctivae normal.   Pulmonary:      Effort: Pulmonary effort is normal. "   Skin:     General: Skin is warm and dry.      Coloration: Skin is not pale.             Comments: 2cm round area of erythema and induration with central dark point of scabbing.    Neurological:      General: No focal deficit present.      Mental Status: He is alert and oriented to person, place, and time.   Psychiatric:         Mood and Affect: Mood normal.         Assessment/Plan:     1. Skin infection  - sulfamethoxazole-trimethoprim (BACTRIM DS) 800-160 MG tablet; Take 1 Tablet by mouth 2 times a day for 7 days.  Dispense: 14 Tablet; Refill: 0    2. Medication refill  - glipiZIDE (GLUCOTROL) 5 MG Tab; Take 1 Tablet by mouth 1 time a day as needed (Take daily for blood sugar management).  Dispense: 30 Tablet; Refill: 0    3. History of MRSA infection  -Keep area clean and dry  -Wash gently with soap and water  -Follow up with PCP for further diabetes and hypertension medication management  -RTC if symptoms worsen or fail to resolve    MDM/Comments:  I have prepared for this visit by personally reviewing the patient's prevous medical records and labs including: most recent wound culture results showing positive MRSA, most recent CBC, CMP, and GFR of 17. Creatinine of 4. CrCl of 34. Tolerated previous dose of bactrim with resolution of symptoms.  Renally dosed Bactrim prescribed to patient with MRSA coverage.  Refill for glipizide given.  Patient will follow-up with primary care.  Vital signs reviewed.  Patient will follow-up with primary care for further blood pressure management.    Differential diagnosis, natural history, supportive care, and indications for immediate follow-up discussed. All questions answered. Patient agrees with the plan of care.    Follow-up as needed if symptoms worsen or fail to improve to PCP, Urgent care or Emergency Room.    I have personally reviewed prior external notes and test results pertinent to today's visit.  I have independently reviewed and interpreted all diagnostics ordered  during this urgent care acute visit.   Discussed management options (risks,benefits, and alternatives to treatment). Pt expresses understanding and the treatment plan was agreed upon. Questions were encouraged and answered to pt's satisfaction.    Please note that this dictation was created using voice recognition software. I have made a reasonable attempt to correct obvious errors, but I expect that there are errors of grammar and possibly content that I did not discover before finalizing the note.

## 2023-06-29 ENCOUNTER — HOSPITAL ENCOUNTER (OUTPATIENT)
Dept: LAB | Facility: MEDICAL CENTER | Age: 48
End: 2023-06-29
Payer: COMMERCIAL

## 2023-06-29 ENCOUNTER — TELEPHONE (OUTPATIENT)
Dept: URGENT CARE | Facility: PHYSICIAN GROUP | Age: 48
End: 2023-06-29

## 2023-06-29 ENCOUNTER — OFFICE VISIT (OUTPATIENT)
Dept: URGENT CARE | Facility: PHYSICIAN GROUP | Age: 48
End: 2023-06-29
Payer: COMMERCIAL

## 2023-06-29 VITALS
OXYGEN SATURATION: 97 % | SYSTOLIC BLOOD PRESSURE: 164 MMHG | RESPIRATION RATE: 16 BRPM | HEART RATE: 85 BPM | WEIGHT: 230 LBS | TEMPERATURE: 98.8 F | DIASTOLIC BLOOD PRESSURE: 84 MMHG | BODY MASS INDEX: 34.86 KG/M2 | HEIGHT: 68 IN

## 2023-06-29 DIAGNOSIS — R03.0 ELEVATED BLOOD PRESSURE READING: ICD-10-CM

## 2023-06-29 DIAGNOSIS — R10.13 EPIGASTRIC PAIN: ICD-10-CM

## 2023-06-29 DIAGNOSIS — R11.2 NAUSEA AND VOMITING, UNSPECIFIED VOMITING TYPE: ICD-10-CM

## 2023-06-29 DIAGNOSIS — R10.9 ABDOMINAL CRAMPING: ICD-10-CM

## 2023-06-29 DIAGNOSIS — R80.9 PROTEINURIA, UNSPECIFIED TYPE: ICD-10-CM

## 2023-06-29 LAB
ALBUMIN SERPL BCP-MCNC: 3.9 G/DL (ref 3.2–4.9)
ALBUMIN/GLOB SERPL: 1.3 G/DL
ALP SERPL-CCNC: 60 U/L (ref 30–99)
ALT SERPL-CCNC: 18 U/L (ref 2–50)
ANION GAP SERPL CALC-SCNC: 22 MMOL/L (ref 7–16)
APPEARANCE UR: CLEAR
AST SERPL-CCNC: 14 U/L (ref 12–45)
BASOPHILS # BLD AUTO: 1 % (ref 0–1.8)
BASOPHILS # BLD: 0.07 K/UL (ref 0–0.12)
BILIRUB SERPL-MCNC: 0.2 MG/DL (ref 0.1–1.5)
BILIRUB UR STRIP-MCNC: NORMAL MG/DL
BUN SERPL-MCNC: 144 MG/DL (ref 8–22)
CALCIUM ALBUM COR SERPL-MCNC: 8.2 MG/DL (ref 8.5–10.5)
CALCIUM SERPL-MCNC: 8.1 MG/DL (ref 8.5–10.5)
CHLORIDE SERPL-SCNC: 105 MMOL/L (ref 96–112)
CO2 SERPL-SCNC: 12 MMOL/L (ref 20–33)
COLOR UR AUTO: NORMAL
CREAT SERPL-MCNC: 14.3 MG/DL (ref 0.5–1.4)
EOSINOPHIL # BLD AUTO: 0.41 K/UL (ref 0–0.51)
EOSINOPHIL NFR BLD: 5.7 % (ref 0–6.9)
ERYTHROCYTE [DISTWIDTH] IN BLOOD BY AUTOMATED COUNT: 46.6 FL (ref 35.9–50)
GFR SERPLBLD CREATININE-BSD FMLA CKD-EPI: 4 ML/MIN/1.73 M 2
GLOBULIN SER CALC-MCNC: 2.9 G/DL (ref 1.9–3.5)
GLUCOSE SERPL-MCNC: 99 MG/DL (ref 65–99)
GLUCOSE UR STRIP.AUTO-MCNC: NORMAL MG/DL
HCT VFR BLD AUTO: 23 % (ref 42–52)
HGB BLD-MCNC: 7.6 G/DL (ref 14–18)
IMM GRANULOCYTES # BLD AUTO: 0.02 K/UL (ref 0–0.11)
IMM GRANULOCYTES NFR BLD AUTO: 0.3 % (ref 0–0.9)
KETONES UR STRIP.AUTO-MCNC: NORMAL MG/DL
LEUKOCYTE ESTERASE UR QL STRIP.AUTO: NORMAL
LIPASE SERPL-CCNC: 113 U/L (ref 11–82)
LYMPHOCYTES # BLD AUTO: 1.41 K/UL (ref 1–4.8)
LYMPHOCYTES NFR BLD: 19.7 % (ref 22–41)
MCH RBC QN AUTO: 29.9 PG (ref 27–33)
MCHC RBC AUTO-ENTMCNC: 33 G/DL (ref 32.3–36.5)
MCV RBC AUTO: 90.6 FL (ref 81.4–97.8)
MONOCYTES # BLD AUTO: 0.64 K/UL (ref 0–0.85)
MONOCYTES NFR BLD AUTO: 9 % (ref 0–13.4)
NEUTROPHILS # BLD AUTO: 4.6 K/UL (ref 1.82–7.42)
NEUTROPHILS NFR BLD: 64.3 % (ref 44–72)
NITRITE UR QL STRIP.AUTO: NORMAL
NRBC # BLD AUTO: 0 K/UL
NRBC BLD-RTO: 0 /100 WBC (ref 0–0.2)
PH UR STRIP.AUTO: 5 [PH] (ref 5–8)
PLATELET # BLD AUTO: 246 K/UL (ref 164–446)
PMV BLD AUTO: 10.4 FL (ref 9–12.9)
POTASSIUM SERPL-SCNC: 4.4 MMOL/L (ref 3.6–5.5)
PROT SERPL-MCNC: 6.8 G/DL (ref 6–8.2)
PROT UR QL STRIP: >=300 MG/DL
RBC # BLD AUTO: 2.54 M/UL (ref 4.7–6.1)
RBC UR QL AUTO: NORMAL
SODIUM SERPL-SCNC: 139 MMOL/L (ref 135–145)
SP GR UR STRIP.AUTO: 1.02
TROPONIN T SERPL-MCNC: 99 NG/L (ref 6–19)
UROBILINOGEN UR STRIP-MCNC: 0.2 MG/DL
WBC # BLD AUTO: 7.2 K/UL (ref 4.8–10.8)

## 2023-06-29 PROCEDURE — 3079F DIAST BP 80-89 MM HG: CPT

## 2023-06-29 PROCEDURE — 84484 ASSAY OF TROPONIN QUANT: CPT

## 2023-06-29 PROCEDURE — 36415 COLL VENOUS BLD VENIPUNCTURE: CPT

## 2023-06-29 PROCEDURE — 99215 OFFICE O/P EST HI 40 MIN: CPT

## 2023-06-29 PROCEDURE — 85025 COMPLETE CBC W/AUTO DIFF WBC: CPT

## 2023-06-29 PROCEDURE — 80053 COMPREHEN METABOLIC PANEL: CPT

## 2023-06-29 PROCEDURE — 3077F SYST BP >= 140 MM HG: CPT

## 2023-06-29 PROCEDURE — 93000 ELECTROCARDIOGRAM COMPLETE: CPT

## 2023-06-29 PROCEDURE — 81002 URINALYSIS NONAUTO W/O SCOPE: CPT

## 2023-06-29 PROCEDURE — 83690 ASSAY OF LIPASE: CPT

## 2023-06-29 RX ORDER — ONDANSETRON 4 MG/1
4 TABLET, ORALLY DISINTEGRATING ORAL EVERY 6 HOURS PRN
Qty: 15 TABLET | Refills: 0 | Status: SHIPPED | OUTPATIENT
Start: 2023-06-29

## 2023-06-29 ASSESSMENT — ENCOUNTER SYMPTOMS
NAUSEA: 1
PND: 0
SPUTUM PRODUCTION: 0
SHORTNESS OF BREATH: 0
FEVER: 0
MYALGIAS: 0
PALPITATIONS: 0
COUGH: 0
DIARRHEA: 0
DIAPHORESIS: 0
HEADACHES: 0
CONSTIPATION: 0
BLOOD IN STOOL: 0
ORTHOPNEA: 0
HEARTBURN: 0
HEMOPTYSIS: 0
CLAUDICATION: 0
WHEEZING: 0
VOMITING: 1
CHILLS: 0
FLANK PAIN: 0
ABDOMINAL PAIN: 1
WEIGHT LOSS: 0

## 2023-06-29 ASSESSMENT — FIBROSIS 4 INDEX: FIB4 SCORE: 0.75

## 2023-06-29 NOTE — PROGRESS NOTES
Subjective:     Real Perry is a 48 y.o. male who presents for Emesis (X 5-6 days, pt states he can keep a little down), Abdominal Pain (Pt states the pain in right in the middle and it comes and goes but it keeps him from eating and vomiting. X 3 wks), Stool Color Change (Pt states the color is darker ), Fatigue (With weakness ), and Other (Pt states he's been off this medication due to not being able to keep it down)      The patient endorses that during the past two weeks, he has been experiencing intermittent non-pulsatile cramping epigastric pain.  Over the past week, the episodes of cramping, intermittent pains have been more frequent.  The cramping episodes range from 1-6 times per day.  He likens the cramping to a hunger pain. The more upright he is, the more he experiences the pain. The pain is localized to the epigastric area and lasts anywhere from 30 seconds to several minutes and does not radiate.  Associated symptoms include nausea and vomiting.  He vomits approximately 2-4 times per day.  Once he vomits, the abdominal pain subsides.  Due to the intermittent nausea and vomiting, he has not had a complete meal and kept it down for 6 six days. The pain is not exacerbated with eating.  Pertinent negatives include no anorexia, arthralgias, back pain, belching, bloating, constipation, diarrhea, dysuria, fever, flatus, frequency, headaches, hematochezia, hematuria, back pain, melena, myalgias lightheadedness, palpitations, chest pain, or unexplained weight loss.  No change in bowel pattern, but he feels that his stool is darker. No recent international travel. He endorses significantly decreased water intake as half of water is not vomited. No electrolyte replacement. 5-10% of amount of food that he eats can be kept down.  He has not been able to keep down any medications down due to nausea and vomiting.  He has a past medical history of hypertension, smoking.  Denies excessive alcohol consumption.   He has not tried anything for his symptoms at this time.         Review of Systems   Constitutional:  Negative for chills, diaphoresis, fever, malaise/fatigue and weight loss.   Respiratory:  Negative for cough, hemoptysis, sputum production, shortness of breath and wheezing.    Cardiovascular:  Negative for chest pain, palpitations, orthopnea, claudication, leg swelling and PND.   Gastrointestinal:  Positive for abdominal pain, nausea and vomiting. Negative for blood in stool, constipation, diarrhea, heartburn and melena.   Genitourinary:  Negative for dysuria, flank pain, frequency, hematuria and urgency.   Musculoskeletal:  Negative for myalgias.   Neurological:  Negative for headaches.   All other systems reviewed and are negative.      PMH:   Past Medical History:   Diagnosis Date    Cerumen debris on tympanic membrane of both ears 9/20/2022    Diabetes     Diet and oral medications    High cholesterol     Hypertension     Pneumonia 10/15/2021    Psychiatric problem     Has low emotional periods    Renal disorder     High strain     ALLERGIES: No Known Allergies  SURGHX:   Past Surgical History:   Procedure Laterality Date    VITRECTOMY POSTERIOR Left 1/25/2022    Procedure: VITRECTOMY, POSTERIOR PORTION - MEMBRANE PEEL ENDO LASER, GAS (C3F8);  Surgeon: Joanne Angel M.D.;  Location: SURGERY SAME DAY [facility];  Service: Ophthalmology    OPEN REDUCTION Right 07/1991    Foot     SOCHX:   Social History     Socioeconomic History    Marital status: Single   Tobacco Use    Smoking status: Every Day     Packs/day: 0.50     Years: 22.00     Pack years: 11.00     Types: Cigarettes    Smokeless tobacco: Never   Vaping Use    Vaping Use: Never used   Substance and Sexual Activity    Alcohol use: Not Currently    Drug use: No    Sexual activity: Yes     Partners: Female     FH:   Family History   Problem Relation Age of Onset    Stroke Father     Cancer Father         prostate    Diabetes Mother     Diabetes Maternal  "Grandmother     Cancer Paternal Grandfather         lung and prostate         Objective:   BP (!) 164/84   Pulse 85   Temp 37.1 °C (98.8 °F) (Temporal)   Resp 16   Ht 1.727 m (5' 8\")   Wt 104 kg (230 lb)   SpO2 97%   BMI 34.97 kg/m²     Physical Exam  Vitals reviewed.   Constitutional:       General: He is not in acute distress.     Appearance: Normal appearance. He is not ill-appearing, toxic-appearing or diaphoretic.   HENT:      Head: Normocephalic and atraumatic.      Right Ear: External ear normal.      Left Ear: External ear normal.      Nose: Nose normal.      Mouth/Throat:      Mouth: Mucous membranes are moist.      Pharynx: Oropharynx is clear.   Eyes:      Extraocular Movements: Extraocular movements intact.      Conjunctiva/sclera: Conjunctivae normal.      Pupils: Pupils are equal, round, and reactive to light.   Cardiovascular:      Rate and Rhythm: Normal rate and regular rhythm.      Pulses: Normal pulses.      Heart sounds: Normal heart sounds.   Pulmonary:      Effort: Pulmonary effort is normal.      Breath sounds: Normal breath sounds.   Abdominal:      General: Abdomen is flat. Bowel sounds are normal. There is no distension or abdominal bruit. There are no signs of injury.      Palpations: Abdomen is soft. There is no hepatomegaly, splenomegaly, mass or pulsatile mass.      Tenderness: There is abdominal tenderness in the epigastric area. There is no right CVA tenderness, left CVA tenderness, guarding or rebound. Negative signs include Fox's sign, Rovsing's sign, McBurney's sign, psoas sign and obturator sign.          Comments: Mild TTP over epigastric area.  No overlying skin changes.  Pain does not radiate.  Negative Fox's, McBurney's, Rozex, psoas, obturator sign.  No pulsatile mass appreciated. No guarding, grimacing.    Musculoskeletal:         General: Normal range of motion.      Cervical back: Normal range of motion and neck supple. No rigidity or tenderness. "   Lymphadenopathy:      Cervical: No cervical adenopathy.   Skin:     General: Skin is warm and dry.   Neurological:      General: No focal deficit present.      Mental Status: He is alert and oriented to person, place, and time. Mental status is at baseline.   Psychiatric:         Mood and Affect: Mood normal.         Behavior: Behavior normal.         Thought Content: Thought content normal.         Judgment: Judgment normal.       EKG: NSR rate: 80, normal axis, normal intervals, no evidence of ischemia or hypertrophy.     Lab Results   Component Value Date/Time    POCCOLOR light yellow 06/29/2023 10:54 AM    POCAPPEAR clear 06/29/2023 10:54 AM    POCLEUKEST neg 06/29/2023 10:54 AM    POCNITRITE neg 06/29/2023 10:54 AM    POCUROBILIGE 0.2 06/29/2023 10:54 AM    POCPROTEIN >=300 06/29/2023 10:54 AM    POCURPH 5.0 06/29/2023 10:54 AM    POCBLOOD small 06/29/2023 10:54 AM    POCSPGRV 1.020 06/29/2023 10:54 AM    POCKETONES neg 06/29/2023 10:54 AM    POCBILIRUBIN neg 06/29/2023 10:54 AM    POCGLUCUA neg 06/29/2023 10:54 AM       Assessment/Plan:   Assessment      1. Epigastric pain  - ondansetron (ZOFRAN ODT) 4 MG TABLET DISPERSIBLE; Take 1 Tablet by mouth every 6 hours as needed for Nausea/Vomiting for up to 15 doses.  Dispense: 15 Tablet; Refill: 0  - CBC WITH DIFFERENTIAL; Future  - Comp Metabolic Panel; Future  - LIPASE; Future  - TROPONIN; Future  - EKG - Clinic Performed  - POCT Urinalysis    2. Elevated blood pressure reading    3. Abdominal cramping    4. Nausea and vomiting, unspecified vomiting type    5. Proteinuria, unspecified type     Patient stable in clinic and no signs of acute distress.  EKG completed  compared to EKG from 2022 in 2018.  Minor changes appreciated with no signs of ischemia or hypertrophy.  He is not having chest pain, heartburn, pain radiating from the abdomen to the back and denies feeling ill while in clinic.  Based on patient's symptoms, symptom duration, lack of fluid intake over  the past week, and physical exam findings patient ultimately referred to emergency department for evaluation and treatment.  Patient in agreement with this plan of care, but verbalizes he does not have a mode of transportation to an emergency room. He attempted to arrange a ride to the emergency room, but was unsuccessful in the clinic.  I verbalized to the patient that was AGAINST MEDICAL ADVICE to not go to the ED today for evaluation.     Declined use of emergency medical services for transportation and verbalized that he will attempt to arrange his own transportation.  Discussed differential diagnosis at length with the patient in the clinic, including, but not limited to AAA, pancreatitis, gallbladder disease, gastroparesis, Gastroenteritis, GERD, glomerulonephritis, peptic ulcer disease, and ACS.  CBC, CMP, lipase, troponin ordered.  Patient to have these labs drawn in clinic prior to departure.      Prescription for ondansetron sent to patient's preferred pharmacy to help symptom of nausea and recommended rehydration while arranging transportation to emergency department.    Discussed hypertensive status in clinic.  Patient verbalized that he cannot tolerate his p.o. meds due to nausea.  Encouraged the patient to resume his medication as prescribed by his PCP.  Discussed risks of untreated hypertension.  Patient verbalized understanding.    All questions answered. Patient verbalized understanding and is in agreement with this plan of care. If symptoms are worsening or not improving in 3-5 days, follow-up with PCP or return to UC. Differential diagnosis, natural history, and supportive care discussed. AVS handout given and reviewed with patient. Patient educated on red flags and when to seek treatment back in ED or UC.     I personally reviewed prior external notes and test results pertinent to today's visit.  I have independently reviewed and interpreted all diagnostics ordered during this urgent care visit.      This dictation has been created using voice recognition software. The accuracy of the dictation is limited by the abilities of the software. I expect there may be some errors of grammar and possibly content. I made every attempt to manually correct the errors within my dictation. However, errors related to voice recognition software may still exist and should be interpreted within the appropriate context.    This note was electronically signed by BASIM Flores

## 2023-06-30 NOTE — TELEPHONE ENCOUNTER
Patient called using [employer] and made aware of all lab values. Advised patient to seek immediate treatment in the ED. Recommended EMS as he does not have personal transportation available at this time. All risks of not seeking care in ED reiterated. Patient verbalized understanding.

## 2024-05-13 ENCOUNTER — HOSPITAL ENCOUNTER (OUTPATIENT)
Facility: MEDICAL CENTER | Age: 49
End: 2024-05-13
Attending: NURSE PRACTITIONER
Payer: MEDICARE

## 2024-05-13 ENCOUNTER — OFFICE VISIT (OUTPATIENT)
Dept: MEDICAL GROUP | Facility: PHYSICIAN GROUP | Age: 49
End: 2024-05-13
Payer: MEDICARE

## 2024-05-13 VITALS
HEART RATE: 81 BPM | SYSTOLIC BLOOD PRESSURE: 138 MMHG | RESPIRATION RATE: 16 BRPM | DIASTOLIC BLOOD PRESSURE: 62 MMHG | OXYGEN SATURATION: 93 % | WEIGHT: 245 LBS | BODY MASS INDEX: 37.13 KG/M2 | HEIGHT: 68 IN | TEMPERATURE: 98.6 F

## 2024-05-13 DIAGNOSIS — Z12.11 COLON CANCER SCREENING: ICD-10-CM

## 2024-05-13 DIAGNOSIS — E11.65 UNCONTROLLED TYPE 2 DIABETES MELLITUS WITH HYPERGLYCEMIA (HCC): ICD-10-CM

## 2024-05-13 DIAGNOSIS — E66.01 CLASS 3 SEVERE OBESITY DUE TO EXCESS CALORIES WITHOUT SERIOUS COMORBIDITY WITH BODY MASS INDEX (BMI) OF 40.0 TO 44.9 IN ADULT (HCC): ICD-10-CM

## 2024-05-13 DIAGNOSIS — E08.319: ICD-10-CM

## 2024-05-13 DIAGNOSIS — E78.5 HYPERLIPIDEMIA, UNSPECIFIED HYPERLIPIDEMIA TYPE: ICD-10-CM

## 2024-05-13 DIAGNOSIS — F17.200 TOBACCO DEPENDENCE: ICD-10-CM

## 2024-05-13 DIAGNOSIS — N50.89 TESTICULAR MASS: ICD-10-CM

## 2024-05-13 DIAGNOSIS — E55.9 VITAMIN D DEFICIENCY: ICD-10-CM

## 2024-05-13 DIAGNOSIS — K40.91 UNILATERAL RECURRENT INGUINAL HERNIA WITHOUT OBSTRUCTION OR GANGRENE: ICD-10-CM

## 2024-05-13 DIAGNOSIS — Z12.5 ENCOUNTER FOR SCREENING FOR MALIGNANT NEOPLASM OF PROSTATE: ICD-10-CM

## 2024-05-13 DIAGNOSIS — E11.9 TYPE 2 DIABETES MELLITUS WITHOUT COMPLICATION, WITHOUT LONG-TERM CURRENT USE OF INSULIN (HCC): ICD-10-CM

## 2024-05-13 DIAGNOSIS — R53.83 FATIGUE, UNSPECIFIED TYPE: ICD-10-CM

## 2024-05-13 DIAGNOSIS — N18.4 STAGE 4 CHRONIC KIDNEY DISEASE (HCC): ICD-10-CM

## 2024-05-13 LAB
CREAT UR-MCNC: 78.96 MG/DL
MICROALBUMIN UR-MCNC: 60 MG/DL
MICROALBUMIN/CREAT UR: 760 MG/G (ref 0–30)

## 2024-05-13 PROCEDURE — 3075F SYST BP GE 130 - 139MM HG: CPT | Performed by: NURSE PRACTITIONER

## 2024-05-13 PROCEDURE — 99214 OFFICE O/P EST MOD 30 MIN: CPT | Performed by: NURSE PRACTITIONER

## 2024-05-13 PROCEDURE — 3078F DIAST BP <80 MM HG: CPT | Performed by: NURSE PRACTITIONER

## 2024-05-13 RX ORDER — SEVELAMER CARBONATE 800 MG/1
TABLET, FILM COATED ORAL
COMMUNITY

## 2024-05-13 RX ORDER — CARVEDILOL 12.5 MG/1
TABLET ORAL
COMMUNITY

## 2024-05-13 RX ORDER — HYDRALAZINE HYDROCHLORIDE 25 MG/1
25 TABLET, FILM COATED ORAL 3 TIMES DAILY
COMMUNITY

## 2024-05-13 RX ORDER — MIDODRINE HYDROCHLORIDE 5 MG/1
TABLET ORAL
COMMUNITY

## 2024-05-13 RX ORDER — FUROSEMIDE 80 MG
80 TABLET ORAL 2 TIMES DAILY
COMMUNITY
End: 2024-05-13

## 2024-05-13 ASSESSMENT — FIBROSIS 4 INDEX: FIB4 SCORE: 0.97

## 2024-05-13 ASSESSMENT — PATIENT HEALTH QUESTIONNAIRE - PHQ9: CLINICAL INTERPRETATION OF PHQ2 SCORE: 0

## 2024-05-13 NOTE — PROGRESS NOTES
"Subjective:     CC:   Chief Complaint   Patient presents with    Diabetes       HPI:   Real presents today with    Type 2 diabetes mellitus (HCC)  Went into renal failure last summer; is now f/b [facility] for dialysis started last July; goes 3x a week   Is waiting for a kidney transplant-on a list  There's a list of items he needs to complete           Tobacco dependence  Quit smoking on 6/27/23              ROS per HPI    Objective:     Exam:  /62   Pulse 81   Temp 37 °C (98.6 °F) (Temporal)   Resp 16   Ht 1.727 m (5' 8\")   Wt 111 kg (245 lb)   SpO2 93%   BMI 37.25 kg/m²  Body mass index is 37.25 kg/m².    Physical Exam:  Constitutional: Well-developed and well-nourished male. Not diaphoretic. No distress.   Skin: warm, dry, intact, no evidence of rash or concerning lesions  Head: Atraumatic without lesions.  Eyes: Conjunctivae are normal. Pupils are equal, round. No scleral icterus.   Ears:  External ears unremarkable.   Neck: Supple, trachea midline. No thyromegaly present. No cervical or supraclavicular lymphadenopathy.  Cardiovascular: Regular rate and rhythm without murmur.   Pulmonary: Clear to ausculation. Normal effort. No rales, ronchi, or wheezing.  Extremities: No cyanosis, clubbing, erythema, nor edema.   Neurological: Alert and oriented x 3.   Psychiatric:  Behavior, mood, and affect are appropriate.        Assessment & Plan:     48 y.o. male with the following -     1. Diabetic retinopathy associated with diabetes mellitus due to underlying condition ([facility])  - Diabetic Monofilament LE Exam  - Microalbumin Creat Ratio Urine (Clinic Collect); Future    2. Vitamin D deficiency disease  - VITAMIN D,25 HYDROXY (DEFICIENCY); Future    3. Hyperlipidemia, unspecified hyperlipidemia type  - Lipid Profile; Future    4. Class 3 severe obesity due to excess calories without serious comorbidity with body mass index (BMI) of 40.0 to 44.9 in adult ([facility])    5. Stage 4 chronic kidney disease ([facility])  - Comp " Metabolic Panel; Future    6. Uncontrolled type 2 diabetes mellitus with hyperglycemia (HCC)  - Comp Metabolic Panel; Future  - HEMOGLOBIN A1C; Future    7. Fatigue, unspecified type  - CBC WITH DIFFERENTIAL; Future  - Comp Metabolic Panel; Future  - TSH WITH REFLEX TO FT4; Future    8. Encounter for screening for malignant neoplasm of prostate  - PROSTATE SPECIFIC AG SCREENING; Future    9. Type 2 diabetes mellitus without complication, without long-term current use of insulin (HCC)    10. Colon cancer screening  - Referral to GI for Colonoscopy    11. Unilateral recurrent inguinal hernia without obstruction or gangrene  - Referral to Urology    12. Testicular mass  - Referral to Urology    13. Tobacco dependence    Other orders  - carvedilol (COREG) 12.5 MG Tab; TAKE 1 TABLET BY MOUTH TWICE DAILY WITH FOOD  - hydrALAZINE (APRESOLINE) 25 MG Tab; Take 25 mg by mouth 3 times a day.  - midodrine (PROAMATINE) 5 MG Tab; TAKE 1 TABLET BY MOUTH THREE TIMES A WEEK AS NEEDED WITH DIALYSIS.  - RENVELA 800 MG Tab tablet         Return in about 3 months (around 8/13/2024) for est care w/new pcp as my last day is 5/30.    Please note that this dictation was created using voice recognition software. I have made every reasonable attempt to correct obvious errors, but I expect that there are errors of grammar and possibly content that I did not discover before finalizing the note.

## 2024-05-13 NOTE — ASSESSMENT & PLAN NOTE
Went into renal failure last summer; is now f/b [facility] for dialysis started last July; goes 3x a week   Is waiting for a kidney transplant-on a list  There's a list of items he needs to complete

## 2024-05-30 ENCOUNTER — DOCUMENTATION (OUTPATIENT)
Dept: HEALTH INFORMATION MANAGEMENT | Facility: OTHER | Age: 49
End: 2024-05-30
Payer: MEDICARE

## 2024-06-18 ENCOUNTER — OFFICE VISIT (OUTPATIENT)
Dept: UROLOGY | Facility: MEDICAL CENTER | Age: 49
End: 2024-06-18
Payer: MEDICARE

## 2024-06-18 VITALS
OXYGEN SATURATION: 90 % | HEART RATE: 89 BPM | HEIGHT: 68 IN | WEIGHT: 245 LBS | TEMPERATURE: 99.3 F | BODY MASS INDEX: 37.13 KG/M2 | DIASTOLIC BLOOD PRESSURE: 77 MMHG | SYSTOLIC BLOOD PRESSURE: 160 MMHG

## 2024-06-18 DIAGNOSIS — K40.90 RIGHT INGUINAL HERNIA: ICD-10-CM

## 2024-06-18 DIAGNOSIS — Z12.5 PROSTATE CANCER SCREENING: ICD-10-CM

## 2024-06-18 PROCEDURE — 3077F SYST BP >= 140 MM HG: CPT | Performed by: UROLOGY

## 2024-06-18 PROCEDURE — 99202 OFFICE O/P NEW SF 15 MIN: CPT | Performed by: UROLOGY

## 2024-06-18 PROCEDURE — 3078F DIAST BP <80 MM HG: CPT | Performed by: UROLOGY

## 2024-06-18 ASSESSMENT — FIBROSIS 4 INDEX: FIB4 SCORE: 0.99

## 2024-06-18 NOTE — PROGRESS NOTES
Chief Complaint: scrotal swelling; prostate cancer screening    HPI: Real Perry is a 49 y.o. male with a history of ESRD secondary to hypertension and diabetes, now on dialysis MWF, referred for prostate cancer screening before possible renal transplant. He also has a long history of a right scrotal swelling and would like that addressed.     He says that about 20 years ago while lifting (leg press) he felt pain and a bulge in the right groin. This fluctuated for many years, but for at least the last few years he has had persistent swelling of the right scrotum and it no longer reduces when supine as it did in the early years. He does not have pain, nor any nausea or vomiting.     Re: prostate cancer screening, he has no baseline PSA.       Past Medical History:  Past Medical History:   Diagnosis Date    Cerumen debris on tympanic membrane of both ears 9/20/2022    Diabetes     Diet and oral medications    High cholesterol     Hypertension     Pneumonia 10/15/2021    Psychiatric problem     Has low emotional periods    Renal disorder     High strain       Past Surgical History:  Past Surgical History:   Procedure Laterality Date    VITRECTOMY POSTERIOR Left 1/25/2022    Procedure: VITRECTOMY, POSTERIOR PORTION - MEMBRANE PEEL ENDO LASER, GAS (C3F8);  Surgeon: Joanne Angel M.D.;  Location: SURGERY SAME DAY [facility];  Service: Ophthalmology    OPEN REDUCTION Right 07/1991    Foot       Family History:  Family History   Problem Relation Age of Onset    Stroke Father     Cancer Father         prostate    Diabetes Mother     Diabetes Maternal Grandmother     Cancer Paternal Grandfather         lung and prostate       Social History:  Social History     Socioeconomic History    Marital status: Single     Spouse name: Not on file    Number of children: Not on file    Years of education: Not on file    Highest education level: Not on file   Occupational History    Not on file   Tobacco Use    Smoking status:  Former     Current packs/day: 0.50     Average packs/day: 0.5 packs/day for 22.0 years (11.0 ttl pk-yrs)     Types: Cigarettes     Passive exposure: Past    Smokeless tobacco: Never   Vaping Use    Vaping status: Never Used   Substance and Sexual Activity    Alcohol use: Not Currently    Drug use: No    Sexual activity: Yes     Partners: Female   Other Topics Concern    Not on file   Social History Narrative    Not on file     Social Determinants of Health     Financial Resource Strain: Not on file   Food Insecurity: Not on file   Transportation Needs: Not on file   Physical Activity: Not on file   Stress: Not on file   Social Connections: Not on file   Intimate Partner Violence: Not on file   Housing Stability: Not on file       Medications:  Current Outpatient Medications   Medication Sig Dispense Refill    carvedilol (COREG) 12.5 MG Tab TAKE 1 TABLET BY MOUTH TWICE DAILY WITH FOOD      hydrALAZINE (APRESOLINE) 25 MG Tab Take 25 mg by mouth 3 times a day.      midodrine (PROAMATINE) 5 MG Tab TAKE 1 TABLET BY MOUTH THREE TIMES A WEEK AS NEEDED WITH DIALYSIS.      RENVELA 800 MG Tab tablet        No current facility-administered medications for this visit.       Allergies:  No Known Allergies    Review of Systems:  Constitutional: Negative for fever, chills and malaise/fatigue.   HENT: Negative for congestion.    Eyes: Negative for pain.   Respiratory: Negative for cough and shortness of breath.    Cardiovascular: Negative for leg swelling.   Gastrointestinal: Negative for nausea, vomiting, abdominal pain and diarrhea.   Genitourinary: Negative for dysuria and hematuria.   Skin: Negative for rash.   Neurological: Negative for dizziness, focal weakness and headaches.   Endo/Heme/Allergies: Does not bruise/bleed easily.   Psychiatric/Behavioral: Negative for depression.  The patient is not nervous/anxious.        Physical Exam:  There were no vitals filed for this visit.    GENERAL: well appearing, well nourished,  NAD  RESP: respiratory effort normal  ABDOMEN: soft, nontender, nondistended, no masses or organomegaly  HERNIAS:  Large right inguinal hernia, not reducible, with palpable movement of gas/bowel contents with Valsalva through the groin. Right testis is displaced anteriorly but is palpable outside of this lesion (so clearly not a hydrocele)  SKIN/LYMPH: normal coloration and turgor, no suspicious skin lesions noted  NEURO/PSYCH: alert, oriented, normal speech, no focal findings or movement disorder noted  EXTREMITIES: no pedal edema noted  GENITOURINARY: penis is normal and testes descended bilaterally  RECTAL: no anorectal masses, Prostate, normal size, consistency; approx. 25 Grams in size    Data Review:    Labs:  POCT UA   Lab Results   Component Value Date/Time    POCCOLOR light yellow 06/29/2023 10:54 AM    POCAPPEAR clear 06/29/2023 10:54 AM    POCLEUKEST neg 06/29/2023 10:54 AM    POCNITRITE neg 06/29/2023 10:54 AM    POCUROBILIGE 0.2 06/29/2023 10:54 AM    POCPROTEIN >=300 06/29/2023 10:54 AM    POCURPH 5.0 06/29/2023 10:54 AM    POCBLOOD small 06/29/2023 10:54 AM    POCSPGRV 1.020 06/29/2023 10:54 AM    POCKETONES neg 06/29/2023 10:54 AM    POCBILIRUBIN neg 06/29/2023 10:54 AM    POCGLUCUA neg 06/29/2023 10:54 AM      CBC   Lab Results   Component Value Date/Time    WBC 6.30 12/28/2023 1323    RBC 3.84 (L) 12/28/2023 1323    HEMOGLOBIN 11.5 (L) 12/28/2023 1323    HEMATOCRIT 34.6 (L) 12/28/2023 1323    MCV 90.1 12/28/2023 1323    MCH 30.1 12/28/2023 1323    MCHC 33.4 12/28/2023 1323    RDW 15.1 12/28/2023 1323    MPV 7.5 12/28/2023 1323    LYMPHOCYTES 33.1 12/28/2023 1323    LYMPHS 2.1 12/28/2023 1323    MONOCYTES 12.4 12/28/2023 1323    MONOS 0.8 12/28/2023 1323    EOSINOPHILS 6.5 12/28/2023 1323    EOS 0.4 12/28/2023 1323    BASOPHILS 1.5 12/28/2023 1323    BASO 0.1 12/28/2023 1323    NRBC 0.00 06/29/2023 1225       CMP   Lab Results   Component Value Date/Time    SODIUM 144 12/28/2023 1323    POTASSIUM  "4.7 12/28/2023 1323    CHLORIDE 103 12/28/2023 1323    CO2 30.0 12/28/2023 1323    ANION 11.0 12/28/2023 1323    GLUCOSE 102 12/28/2023 1323    BUN 82.0 (H) 12/28/2023 1323    CREATININE 7.70 (H) 12/28/2023 1323    GFRCKD 4 (A) 06/29/2023 1225    CALCIUM 9.1 12/28/2023 1323    CORRCALC 8.2 (L) 06/29/2023 1225    ASTSGOT 17 12/28/2023 1323    ALTSGPT 19 12/28/2023 1323    ALKPHOSPHAT 47 12/28/2023 1323    TBILIRUBIN 0.5 12/28/2023 1323    ALBUMIN 4.7 12/28/2023 1323    TOTPROTEIN 7.3 12/28/2023 1323    GLOBULIN 2.9 06/29/2023 1225    AGRATIO 1.8 12/28/2023 1323     INFERTILITY No results found for: \"FSH\", \"LH\", \"PROLACT\", \"ESTRADL\", \"TESTOSTERONE\", \"FREETESTOST\", \"TESTLCMS\", \"SEXHORM\"  PSA No results found for: \"PSATOTAL\"    Assessment: 49 y.o. male with a history of ESRD and pending renal transplant (has a living related donor), here for prostate cancer screening and to discuss bothersome right scrotal swelling.     His digital rectal exam today was normal. He'll obtain a PSA soon, which I ordered now.    He has a very large right inguinal hernia without signs or symptoms of obstruction or incarceration. We discussed treatment and he understands he'll need surgical correction. I'll refer him today for consultation.       Plan:    -Referral to surgery for right inguinal hernia repair  -PSA  -Will call with PSA results and any necessary follow up      Nba Em MD  "

## 2024-09-12 ENCOUNTER — HOSPITAL ENCOUNTER (OUTPATIENT)
Dept: LAB | Facility: MEDICAL CENTER | Age: 49
End: 2024-09-12
Attending: INTERNAL MEDICINE
Payer: COMMERCIAL

## 2024-09-12 LAB — POTASSIUM SERPL-SCNC: 4.8 MMOL/L (ref 3.6–5.5)

## 2024-09-12 PROCEDURE — 36415 COLL VENOUS BLD VENIPUNCTURE: CPT

## 2024-09-12 PROCEDURE — 84132 ASSAY OF SERUM POTASSIUM: CPT

## 2025-01-27 ENCOUNTER — TELEPHONE (OUTPATIENT)
Facility: MEDICAL CENTER | Age: 50
End: 2025-01-27
Payer: COMMERCIAL

## 2025-01-27 NOTE — TELEPHONE ENCOUNTER
"[facility]  Kidney Transplant Program- Referral Review    Patient Information  Patient Name  Real Perry   Date of Birth  1975   MRN  [id number]   [place] Citizenship Yes   BMI  Estimated body mass index is 37.25 kg/m² as calculated from the following:    Height as of 6/18/24: 1.727 m (5' 8\").    Weight as of 6/18/24: 111 kg (245 lb).     Referral Information  Dialysis   Yes   [facility] MWF     Did patient provide social security number?  Yes    Is an  needed: No     Ambulatory assistance needed: No     What hospital or medical center is most care received?  [facility]    Have you had any recent hospitalizations? Yes   Hospital Name: [facility]    When: January 2025  s  Specialty Providers  Do you have a PCP? No    Name:  Do you have a cardiologist or heart doctor? No    Name:  Do you have a urologist? No    Name:  Do you have any other specialty doctors? No    Names(s):  Have you had a colonoscopy? Yes   When: August 2024   Where: Doesn't remember     Provider information will be added to care team and most recent notes and testing will be requested.     Transplant Information:  Evaluated or declined at any other transplant programs? No   If so, which center:   Potential living donors? Yes Possible living donor, nephew   Previously received a transplant? No   Organ: N/A  Where:  When:    Records will be requested and sent to RN coordinator for further review and plan. Patient encouraged to contact us with any questions or medical updates in the interm.     At this time, does [facility] have verbal consent from you, Real Perry, to request records from external facilities for your consult/evaluation? Yes  If patient stated no, we advised that the patient is required to obtain their records and bring them in with them to their first appointment.       Electronically Signed by   Toiyn Galvin  1/27/2025 2:40 PM "

## 2025-07-15 ENCOUNTER — DOCUMENTATION (OUTPATIENT)
Facility: MEDICAL CENTER | Age: 50
End: 2025-07-15
Payer: COMMERCIAL

## 2025-07-15 NOTE — PROGRESS NOTES
KIDNEY TRANSPLANT REFERRAL REVIEW   Transplant RN Coordinator    Date of Review: July 15, 2025    Patient Information  Patient Name Real Perry   MRN [id number]    / Age  1975 / 50 y.o.   Sex Male    Race White [7]    Ethnicity  Not , /a, or Thai origin [1]    Preferred Language / Is an  Required?  English / No   Current Ambulatory Status Independent     Referral Information   Referral Date 2024    Referring Provider/Primary Nephrologist  Dr. Humberto Esparza    Dialysis Facility [facility]  [address]  [place] [address]    Dialysis Modality  ICHD   Dialysis Schedule / Shift (if applicable) MWF, Shift: 2nd   Primary Cause of ESRD/CKD E11.22: Type 2 Diabetes Mellitus with Diabetic Chronic Kidney Disease   Date Regular Chronic Dialysis Began 2023     Dialysis Access Left arm AVF (24)   Blood Type Unknown     Care Team  PCP: Yes, Name: Patient has previously seen both Cheryl M. Demucha, A.PGarryRALEX, and BASIM Crain     Vascular Surgeon: Yes, Name: Dr. Sherman  Urology: Yes, Name: Nba Em M.D    Transplant History & Status Overview  Currently Listed or Being Evaluated at Another Transplant Center(s)? No   Inactive/No Longer Listed at Another Transplant Center(s)? No  Potential Living Kidney Donor(s)? Unknown    Sensitization History  Prior Transplant(s): No   Blood Transfusion History: None Noted During Chart Review  Pregnancy History (if applicable): N/A    Medications & Vaccinations  Allergies:  Venofer   Current Outpatient Medications:   Carvedilol 12.5mg, Oral, BID  Ferrous Sulfate 325mg, Oral, 2 tabs DAILY  Hydralazine 25mg, Oral, TID    Lasix 80mg, Oral, BID  Sevelamer Carbonate 3200mg, Oral, TID  Velpholo 1000mg, Oral, TID    Immunizations  Influenza-Date: 10/14/24, Pneumonia-Date: 23, 23, : Prevnar 20, Hepatitis B 1st Dose-Date: 23, Hepatitis B 2nd Dose-Date: 10/07/23,  "Hepatitis B 3rd Dose-Date: 11/06/23, Hepatitis B 4th Dose-Date: 12/04/23, and Hepatitis 5th Dose-Date: 07/01/24  edical History  Estimated body mass index is 37.25 kg/m² as calculated from the following:    Height as of 6/18/24: 1.727 m (5' 8\").    Weight as of 6/18/24: 111 kg (245 lb).    Social History     Tobacco Use    Smoking status: Former     Current packs/day: 0.50     Average packs/day: 0.5 packs/day for 22.0 years (11.0 ttl pk-yrs)     Types: Cigarettes     Passive exposure: Past    Smokeless tobacco: Never   Vaping Use    Vaping status: Never Used   Substance Use Topics    Alcohol use: Not Currently    Drug use: No     If a history of smoking is present, document pack-year history (# packs/day × years smoked): Former smoker, confirm pack year history   Family History   Problem Relation Age of Onset    Stroke Father     Cancer Father         prostate    Diabetes Mother     Diabetes Maternal Grandmother     Cancer Paternal Grandfather         lung and prostate   HTN-Mother    Past Surgical History:   Procedure Laterality Date    VITRECTOMY POSTERIOR Left 1/25/2022    Procedure: VITRECTOMY, POSTERIOR PORTION - MEMBRANE PEEL ENDO LASER, GAS (C3F8);  Surgeon: Joanne Angel M.D.;  Location: SURGERY SAME DAY [facility];  Service: Ophthalmology    OPEN REDUCTION Right 07/1991    Foot   Inguinal Hernia Repair 09/10/24    Active Ambulatory Problems     Diagnosis Date Noted    Hypertension 01/15/2014    Tobacco dependence 01/15/2014    Hyperlipidemia 02/05/2014    Hypertriglyceridemia 02/05/2014    Vitamin D deficiency disease 02/05/2014    JOY (acute kidney injury) (HCC) 06/25/2019    Cardiomyopathy due to hypertension, without heart failure (HCC) 06/27/2019    Class 3 severe obesity due to excess calories with body mass index (BMI) of 40.0 to 44.9 in adult 06/27/2019    Kidney stone 06/27/2019    Diabetic retinopathy associated with diabetes mellitus due to underlying condition (HCC) 06/29/2022    Stress " 06/29/2022    Income insufficient to meet needs 06/29/2022    Stage 4 chronic kidney disease (HCC) 09/20/2022    Type 2 diabetes mellitus (HCC) 05/13/2024     Resolved Ambulatory Problems     Diagnosis Date Noted    Diabetes mellitus type 2, uncontrolled 01/15/2014    Encounter to establish care 01/15/2014    Accelerated hypertension 06/25/2019    Acute loss of vision, left 06/25/2019    Encounter for medical examination to establish care 06/29/2022    Cerumen debris on tympanic membrane of both ears 09/20/2022     Past Medical History:   Diagnosis Date    Diabetes     High cholesterol     Pneumonia 10/15/2021    Psychiatric problem     Renal disorder    Diabetic Retinopathy      Neurologic History:  History of Stroke/Seizure/TIA: None Noted During Initial Chart Review    Pulmonary History:  History of Asthma/COPD/ARTEM: None Noted During Initial Chart Review    Cardiovascular History:  History of Heart Disease: Yes, Details: Cardiomyopathy  Cardiologist: None Noted  History of Hypertension: Yes  History of Hyperlipidemia: Yes  PVD/PAD: None Noted During Initial Chart Review  History of Bleeding/Clotting Disorder: None Noted During Initial Chart Review    Gastrointestinal/Genitourinary History:   History of GI Disease: None Noted During Initial Chart Review    Diabetes & Metabolic History:  Diabetes Status: Type II Diabetes  Age at Diabetes Onset: Needs Follow-Up  If Type 1, date started on Insulin Therapy: N/A    Infections & Malignancy History:  History of Malignancy: None Noted During Initial Chart Review  Chronic/Recent Infections: Yes, Details: LUE Cellulitis s/p bug bite 06/2023    Other Pertinent Medical/Surgical History: N/A    Recent Hospitalization(s)  06/19/18-HTN  06/24/19-06/28/19-HTN  09/12/24-09/14/24-Inguinal hernia surgery     Diagnostic Studies Review  Test/Procedure Study Date Study Location Key Findings   General Imaging      CXR 09/13/24 [facility]  FINDINGS:   Poor depth of inspiration with  mild bibasal lung atelectasis.   Normal heart size, mediastinal width and pulmonary vascularity.    CT A/P      Cardiac Imaging      EKG 12/28/23 [facility] Sinus rhythm   Probable left atrial enlargement   No previous ECG available for comparison    NM Stress Test      Echocardiogram   06/27/19 [facility]  CONCLUSIONS  No prior study is available for comparison.   Left ventricular ejection fraction is visually estimated to be 55%.  Moderate concentric left ventricular hypertrophy.  Normal inferior vena cava size and inspiratory collapse.  No significant valve disease or flow abnormalities.    Cancer Screenings      Colonoscopy 08/29/24 [facility] 3 [facility]-2027   M: PSA      Renal Imaging & Biopsy      Renal Biopsy      Renal Ultrasound 09/29/22 [facility] FINDINGS:  The right kidney measures 12.66 cm. Increased renal cortical echogenicity.  The left kidney measures 11.12 cm. Increased renal cortical echogenicity.  Benign bilateral renal cysts, which do not require imaging follow-up. One of the left renal cysts contains a small mural calcification.     No hydronephrosis.  No renal stones are detected.     The bladder demonstrates no focal wall abnormality.     The prostate gland is normal in size.     IMPRESSION:     1.  Increased renal cortical echogenicity bilaterally, suggesting medical renal disease. No hydronephrosis.  2.  Bilateral renal cysts, which do not require imaging follow-up.   CT Renal Colic                     Plan & Next Steps  Referral Review Outcome: The patient has been referred for an initial pre-kidney transplant evaluation.  Next Steps:   The multidisciplinary transplant team will review preliminary referral information.   If appropriate, the patient will be scheduled for an evaluation at [facility] [place], pending insurance authorization.    Estefani Duncan R.N.  Transplant Coordinator  [facility]

## 2025-07-16 ENCOUNTER — TELEPHONE (OUTPATIENT)
Facility: MEDICAL CENTER | Age: 50
End: 2025-07-16
Payer: COMMERCIAL

## 2025-07-16 NOTE — TELEPHONE ENCOUNTER
Real Perry, MRN [id number], has out of network benefits though [facility].  I spoke with Amilcar and the reference number is [id number].  His OON benefits will pay 50% after the deductible is met.  The deductible is $13,000 and $451 has been met.  His OON max out of pocket is $1 million and $451 has been met.

## 2025-07-16 NOTE — TELEPHONE ENCOUNTER
Called patient to go over his OON benefits with him.  He did not answer.  Left message asking him to call me back.

## 2025-07-21 ENCOUNTER — TELEPHONE (OUTPATIENT)
Facility: MEDICAL CENTER | Age: 50
End: 2025-07-21
Payer: COMMERCIAL

## 2025-07-21 NOTE — TELEPHONE ENCOUNTER
Placed call to patient to go over his OON benefits with him and to find out if he wants to schedule his transplant evaluation now or wait until we are CMS certified and contracted with his insurance.  Left message asking patient to call me back.

## 2025-07-25 ENCOUNTER — TELEPHONE (OUTPATIENT)
Facility: MEDICAL CENTER | Age: 50
End: 2025-07-25
Payer: COMMERCIAL

## 2025-07-25 NOTE — TELEPHONE ENCOUNTER
Placed call to patient to talk to him about his OON benefits.  Patient would like to wait to be evaluated until we are CMS certified and have a contract with his insurance.  He also stated that he has a living donor.  He would like a call as soon as we are able to see him.

## 2025-08-21 ENCOUNTER — TELEPHONE (OUTPATIENT)
Facility: MEDICAL CENTER | Age: 50
End: 2025-08-21
Payer: COMMERCIAL

## (undated) DEVICE — PACK VITRECTOMY 23G 10K BEVELED (1EA/BX)

## (undated) DEVICE — GLOVE BIOGEL SZ 7.5 SURGICAL PF LTX - (50PR/BX 4BX/CA)

## (undated) DEVICE — SHIELD OPTH AL GRTR CVR FOX (50EA/BX)

## (undated) DEVICE — KIT  I.V. START (100EA/CA)

## (undated) DEVICE — CANISTER SUCTION RIGID RED 1500CC (40EA/CA)

## (undated) DEVICE — FORCEP ILM 23GA DISP REVOLUT. - (6/BX)

## (undated) DEVICE — ELECTRODE 850 FOAM ADHESIVE - HYDROGEL RADIOTRNSPRNT (50/PK)

## (undated) DEVICE — CANNULA DIVIDED ADULT CO2 - SAMPLE W/FEMALE CONNCT (25/CA)

## (undated) DEVICE — WATER IRRIGATION STERILE 1000ML (12EA/CA)

## (undated) DEVICE — TUBING CLEARLINK DUO-VENT - C-FLO (48EA/CA)

## (undated) DEVICE — CANISTER SUCTION 3000ML MECHANICAL FILTER AUTO SHUTOFF MEDI-VAC NONSTERILE LF DISP  (40EA/CA)

## (undated) DEVICE — MASK ANESTHESIA ADULT  - (100/CA)

## (undated) DEVICE — CANNULA SUB-TENONS ANESTH. 1.1X25MM 19GAX1IN (10EA/SP)

## (undated) DEVICE — SUTURE GENERAL

## (undated) DEVICE — GOWN SURGICAL X-LARGE ULTRA - FILM-REINFORCED (20/CA)

## (undated) DEVICE — CATHETER IV 20 GA X 1-1/4 ---SURG.& SDS ONLY--- (50EA/BX)

## (undated) DEVICE — SLEEVE, VASO, THIGH, MED

## (undated) DEVICE — CANNULA INJECTION 27G (EYE) - 10/BX ALCON

## (undated) DEVICE — CORDS BIPOLAR COAGULATION - 12FT STERILE DISP. (10EA/BX)

## (undated) DEVICE — NEEDLE FILTER ASPIRATION 18 GA X 1 1/2 IN (100EA/BX)

## (undated) DEVICE — PROBE 23 GA ILLUM FLEX CURVED - LASER(6/BX)

## (undated) DEVICE — PAD EYE GAUZE COVERED OVAL 1 5/8 X 2 5/8" STERILE"

## (undated) DEVICE — SUTURE EYE

## (undated) DEVICE — SENSOR SPO2 NEO LNCS ADHESIVE (20/BX) SEE USER NOTES

## (undated) DEVICE — KIT ANESTHESIA W/CIRCUIT & 3/LT BAG W/FILTER (20EA/CA)

## (undated) DEVICE — GLOVE BIOGEL PI INDICATOR SZ 7.0 SURGICAL PF LF - (50/BX 4BX/CA)

## (undated) DEVICE — SUCTION INSTRUMENT YANKAUER BULBOUS TIP W/O VENT (50EA/CA)

## (undated) DEVICE — PACK VITRECTOMY (1EA/CA)

## (undated) DEVICE — PROTECTOR ULNA NERVE - (36PR/CA)

## (undated) DEVICE — BACKFLUSH SOFT TIP 23GA - (6/BX)

## (undated) DEVICE — SET LEADWIRE 5 LEAD BEDSIDE DISPOSABLE ECG (1SET OF 5/EA)

## (undated) DEVICE — SYRINGE NON SAFETY 10 CC 20 GA X 1-1/2 IN (100/BX 4BX/CA)

## (undated) DEVICE — TOWEL STOP TIMEOUT SAFETY FLAG (40EA/CA)

## (undated) DEVICE — SODIUM CHL IRRIGATION 0.9% 1000ML (12EA/CA)

## (undated) DEVICE — LACTATED RINGERS INJ 1000 ML - (14EA/CA 60CA/PF)

## (undated) DEVICE — CAUTERY OPTH ENDOTHERMY 25GA - (5/PK)